# Patient Record
Sex: MALE | Race: WHITE | NOT HISPANIC OR LATINO
[De-identification: names, ages, dates, MRNs, and addresses within clinical notes are randomized per-mention and may not be internally consistent; named-entity substitution may affect disease eponyms.]

---

## 2018-03-13 ENCOUNTER — RESULT REVIEW (OUTPATIENT)
Age: 63
End: 2018-03-13

## 2018-03-16 PROBLEM — Z00.00 ENCOUNTER FOR PREVENTIVE HEALTH EXAMINATION: Status: ACTIVE | Noted: 2018-03-16

## 2018-04-18 ENCOUNTER — APPOINTMENT (OUTPATIENT)
Dept: UROLOGY | Facility: CLINIC | Age: 63
End: 2018-04-18

## 2018-05-23 ENCOUNTER — OUTPATIENT (OUTPATIENT)
Dept: OUTPATIENT SERVICES | Facility: HOSPITAL | Age: 63
LOS: 1 days | Discharge: HOME | End: 2018-05-23

## 2018-05-23 ENCOUNTER — APPOINTMENT (OUTPATIENT)
Dept: UROLOGY | Facility: CLINIC | Age: 63
End: 2018-05-23
Payer: COMMERCIAL

## 2018-05-23 VITALS
HEART RATE: 96 BPM | HEIGHT: 68 IN | WEIGHT: 248 LBS | BODY MASS INDEX: 37.59 KG/M2 | SYSTOLIC BLOOD PRESSURE: 141 MMHG | DIASTOLIC BLOOD PRESSURE: 79 MMHG

## 2018-05-23 DIAGNOSIS — K46.9 UNSPECIFIED ABDOMINAL HERNIA W/OUT OBSTRUCTION OR GANGRENE: ICD-10-CM

## 2018-05-23 DIAGNOSIS — E11.9 TYPE 2 DIABETES MELLITUS W/OUT COMPLICATIONS: ICD-10-CM

## 2018-05-23 DIAGNOSIS — Z78.9 OTHER SPECIFIED HEALTH STATUS: ICD-10-CM

## 2018-05-23 DIAGNOSIS — E78.5 HYPERLIPIDEMIA, UNSPECIFIED: ICD-10-CM

## 2018-05-23 DIAGNOSIS — R35.1 NOCTURIA: ICD-10-CM

## 2018-05-23 DIAGNOSIS — Z12.5 ENCOUNTER FOR SCREENING FOR MALIGNANT NEOPLASM OF PROSTATE: ICD-10-CM

## 2018-05-23 PROCEDURE — 99205 OFFICE O/P NEW HI 60 MIN: CPT

## 2018-05-23 RX ORDER — TRIAMCINOLONE ACETONIDE 5 MG/G
0.5 CREAM TOPICAL
Qty: 60 | Refills: 0 | Status: ACTIVE | COMMUNITY
Start: 2018-02-19

## 2018-05-23 RX ORDER — SITAGLIPTIN AND METFORMIN HYDROCHLORIDE 50; 1000 MG/1; MG/1
50-1000 TABLET, FILM COATED ORAL
Qty: 180 | Refills: 0 | Status: ACTIVE | COMMUNITY
Start: 2017-08-15

## 2018-05-23 RX ORDER — SIMVASTATIN 10 MG/1
10 TABLET, FILM COATED ORAL
Qty: 60 | Refills: 0 | Status: ACTIVE | COMMUNITY
Start: 2017-07-21

## 2018-05-23 RX ORDER — CHOLECALCIFEROL (VITAMIN D3) 25 MCG
25 MCG CAPSULE ORAL
Qty: 60 | Refills: 0 | Status: ACTIVE | COMMUNITY
Start: 2018-03-14

## 2018-05-23 RX ORDER — LEVOTHYROXINE SODIUM 0.12 MG/1
125 TABLET ORAL
Qty: 120 | Refills: 0 | Status: ACTIVE | COMMUNITY
Start: 2018-03-14

## 2018-05-23 RX ORDER — LEVOTHYROXINE SODIUM 0.17 MG/1
175 TABLET ORAL
Qty: 30 | Refills: 0 | Status: ACTIVE | COMMUNITY
Start: 2017-10-16

## 2018-05-25 LAB
PSA FREE FLD-MCNC: 26.9
PSA FREE SERPL-MCNC: 0.32 NG/ML
PSA SERPL-MCNC: 1.19 NG/ML

## 2018-05-30 ENCOUNTER — APPOINTMENT (OUTPATIENT)
Dept: UROLOGY | Facility: CLINIC | Age: 63
End: 2018-05-30
Payer: COMMERCIAL

## 2018-05-30 ENCOUNTER — OTHER (OUTPATIENT)
Age: 63
End: 2018-05-30

## 2018-05-30 VITALS
WEIGHT: 248 LBS | HEART RATE: 94 BPM | BODY MASS INDEX: 37.59 KG/M2 | HEIGHT: 68 IN | SYSTOLIC BLOOD PRESSURE: 132 MMHG | DIASTOLIC BLOOD PRESSURE: 76 MMHG

## 2018-05-30 PROCEDURE — 99214 OFFICE O/P EST MOD 30 MIN: CPT

## 2018-05-30 RX ORDER — CLOBETASOL PROPIONATE 0.5 MG/G
0.05 OINTMENT TOPICAL
Qty: 120 | Refills: 0 | Status: ACTIVE | COMMUNITY
Start: 2018-02-20

## 2018-05-30 RX ORDER — TERBINAFINE HYDROCHLORIDE 250 MG/1
250 TABLET ORAL
Qty: 30 | Refills: 0 | Status: ACTIVE | COMMUNITY
Start: 2018-03-16

## 2018-05-30 RX ORDER — GABAPENTIN 300 MG/1
300 CAPSULE ORAL
Qty: 60 | Refills: 0 | Status: ACTIVE | COMMUNITY
Start: 2018-05-22

## 2018-05-30 RX ORDER — BUPROPION HYDROCHLORIDE 150 MG/1
150 TABLET, FILM COATED, EXTENDED RELEASE ORAL
Qty: 60 | Refills: 0 | Status: ACTIVE | COMMUNITY
Start: 2017-10-20

## 2018-05-30 RX ORDER — HYDROCORTISONE 1 %
12 CREAM (GRAM) TOPICAL
Qty: 400 | Refills: 0 | Status: ACTIVE | COMMUNITY
Start: 2018-02-12

## 2018-05-30 RX ORDER — HYDROXYZINE HYDROCHLORIDE 25 MG/1
25 TABLET ORAL
Qty: 30 | Refills: 0 | Status: ACTIVE | COMMUNITY
Start: 2018-02-19

## 2018-05-30 RX ORDER — OXYCODONE AND ACETAMINOPHEN 10; 325 MG/1; MG/1
10-325 TABLET ORAL
Qty: 90 | Refills: 0 | Status: ACTIVE | COMMUNITY
Start: 2017-12-18

## 2018-05-30 RX ORDER — PEN NEEDLE, DIABETIC 29 G X1/2"
31G X 8 MM NEEDLE, DISPOSABLE MISCELLANEOUS
Qty: 100 | Refills: 0 | Status: ACTIVE | COMMUNITY
Start: 2018-01-10

## 2018-05-30 NOTE — ASSESSMENT
[FreeTextEntry1] : This is a 63 year male who has history of ED for 3 years.\par Too soft to penetrate.\par Sex drive is normal.\par Tried viagra and cialis full dose with poor effect.\par \par states that urinates about 3-4 times per nights\par \par left testicular nodule not painful\par LELE showed about 50g prostate no nodules. \par \par Culture - Urine\par \par May 25th 2018 PSA Profile - Total = 1.19\par US Doppler Scrotum and bladder sonogram were not done yet\par Patient is interested in penile prosthesis\par Patient would like to proceed with implantation of inflatable penile prosthesis.  We discussed other options for treatment including injecting trimix, vacuum erection device but would like to proceed with implantation.  Aware of risks and benefits of the procedure including risk of infection and erosion of the device that may result in the need to remove the device with possible reimplantation in the future. Also aware that there will be pain and swelling to the penis and scrotum after the surgery.  Also aware that there are risks to the anesthesia including death. Also aware of the risk of having the perception of a shortened penis after the procedure.  He will not be allowed to have sex at least until 6 weeks after the surgery. \par

## 2018-05-30 NOTE — LETTER BODY
[Dear  ___] : Dear  [unfilled], [Courtesy Letter:] : I had the pleasure of seeing your patient, [unfilled], in my office today. [Please see my note below.] : Please see my note below. [Consult Closing:] : Thank you very much for allowing me to participate in the care of this patient.  If you have any questions, please do not hesitate to contact me. [Sincerely,] : Sincerely, [FreeTextEntry3] : Thomas Epperson MD\par Director of Male Sexual Dysfunction and Urologic Prosthetics

## 2018-05-30 NOTE — HISTORY OF PRESENT ILLNESS
[FreeTextEntry1] : This is a 63 year male who has history of ED for 3 years.\par Too soft to penetrate.\par Sex drive is normal.\par Tried viagra and cialis full dose with poor effect.\par \par states that urinates about 3-4 times per nights\par \par left testicular nodule not painful\par LELE showed about 50g prostate no nodules. \par \par Culture - Urine\par \par May 25th 2018 PSA Profile - Total = 1.19\par US Doppler Scrotum and bladder sonogram were not done yet\par Patient is interested in penile prosthesis\par Patient would like to proceed with implantation of inflatable penile prosthesis.  We discussed other options for treatment including injecting trimix, vacuum erection device but would like to proceed with implantation.  Aware of risks and benefits of the procedure including risk of infection and erosion of the device that may result in the need to remove the device with possible reimplantation in the future. Also aware that there will be pain and swelling to the penis and scrotum after the surgery.  Also aware that there are risks to the anesthesia including death. Also aware of the risk of having the perception of a shortened penis after the procedure.  He will not be allowed to have sex at least until 6 weeks after the surgery. \par \par Arrangements for surgery will be started. \par

## 2018-05-30 NOTE — PHYSICAL EXAM
[General Appearance - Well Developed] : well developed [General Appearance - Well Nourished] : well nourished [Normal Appearance] : normal appearance [Well Groomed] : well groomed [General Appearance - In No Acute Distress] : no acute distress [Abdomen Soft] : soft [Abdomen Tenderness] : non-tender [Costovertebral Angle Tenderness] : no ~M costovertebral angle tenderness [Urethral Meatus] : meatus normal [FreeTextEntry1] : \par left testicular nodule not painful\par LELE showed about 50g prostate no nodules [Skin Color & Pigmentation] : normal skin color and pigmentation [Edema] : no peripheral edema [] : no respiratory distress [Respiration, Rhythm And Depth] : normal respiratory rhythm and effort [Exaggerated Use Of Accessory Muscles For Inspiration] : no accessory muscle use [Oriented To Time, Place, And Person] : oriented to person, place, and time [Affect] : the affect was normal [Mood] : the mood was normal [Not Anxious] : not anxious [Normal Station and Gait] : the gait and station were normal for the patient's age [No Focal Deficits] : no focal deficits

## 2018-05-31 ENCOUNTER — OUTPATIENT (OUTPATIENT)
Dept: OUTPATIENT SERVICES | Facility: HOSPITAL | Age: 63
LOS: 1 days | Discharge: HOME | End: 2018-05-31

## 2018-05-31 DIAGNOSIS — N40.1 BENIGN PROSTATIC HYPERPLASIA WITH LOWER URINARY TRACT SYMPTOMS: ICD-10-CM

## 2018-06-02 ENCOUNTER — OUTPATIENT (OUTPATIENT)
Dept: OUTPATIENT SERVICES | Facility: HOSPITAL | Age: 63
LOS: 1 days | Discharge: HOME | End: 2018-06-02

## 2018-06-02 DIAGNOSIS — N50.89 OTHER SPECIFIED DISORDERS OF THE MALE GENITAL ORGANS: ICD-10-CM

## 2018-06-08 LAB — BACTERIA UR CULT: NORMAL

## 2018-06-20 ENCOUNTER — APPOINTMENT (OUTPATIENT)
Dept: UROLOGY | Facility: CLINIC | Age: 63
End: 2018-06-20
Payer: COMMERCIAL

## 2018-06-20 VITALS
HEART RATE: 107 BPM | HEIGHT: 68 IN | WEIGHT: 248 LBS | SYSTOLIC BLOOD PRESSURE: 141 MMHG | BODY MASS INDEX: 37.59 KG/M2 | DIASTOLIC BLOOD PRESSURE: 75 MMHG

## 2018-06-20 DIAGNOSIS — N50.89 OTHER SPECIFIED DISORDERS OF THE MALE GENITAL ORGANS: ICD-10-CM

## 2018-06-20 DIAGNOSIS — Z12.5 ENCOUNTER FOR SCREENING FOR MALIGNANT NEOPLASM OF PROSTATE: ICD-10-CM

## 2018-06-20 PROCEDURE — 99213 OFFICE O/P EST LOW 20 MIN: CPT

## 2018-06-25 PROBLEM — N50.89 TESTICULAR NODULE: Status: ACTIVE | Noted: 2018-05-23

## 2018-06-25 PROBLEM — Z12.5 SCREENING FOR PROSTATE CANCER: Status: ACTIVE | Noted: 2018-05-23

## 2018-07-06 ENCOUNTER — OUTPATIENT (OUTPATIENT)
Dept: OUTPATIENT SERVICES | Facility: HOSPITAL | Age: 63
LOS: 1 days | Discharge: HOME | End: 2018-07-06

## 2018-07-06 VITALS
SYSTOLIC BLOOD PRESSURE: 145 MMHG | OXYGEN SATURATION: 98 % | TEMPERATURE: 97 F | DIASTOLIC BLOOD PRESSURE: 65 MMHG | HEIGHT: 69 IN | RESPIRATION RATE: 18 BRPM | WEIGHT: 251.33 LBS | HEART RATE: 89 BPM

## 2018-07-06 DIAGNOSIS — Z98.890 OTHER SPECIFIED POSTPROCEDURAL STATES: Chronic | ICD-10-CM

## 2018-07-06 DIAGNOSIS — N52.8 OTHER MALE ERECTILE DYSFUNCTION: ICD-10-CM

## 2018-07-06 DIAGNOSIS — Z01.818 ENCOUNTER FOR OTHER PREPROCEDURAL EXAMINATION: ICD-10-CM

## 2018-07-06 LAB
ALBUMIN SERPL ELPH-MCNC: 4.8 G/DL — SIGNIFICANT CHANGE UP (ref 3.5–5.2)
ALP SERPL-CCNC: 90 U/L — SIGNIFICANT CHANGE UP (ref 30–115)
ALT FLD-CCNC: 30 U/L — SIGNIFICANT CHANGE UP (ref 0–41)
ANION GAP SERPL CALC-SCNC: 16 MMOL/L — HIGH (ref 7–14)
APPEARANCE UR: CLEAR — SIGNIFICANT CHANGE UP
APTT BLD: 33.6 SEC — SIGNIFICANT CHANGE UP (ref 27–39.2)
AST SERPL-CCNC: 30 U/L — SIGNIFICANT CHANGE UP (ref 0–41)
BASOPHILS # BLD AUTO: 0.07 K/UL — SIGNIFICANT CHANGE UP (ref 0–0.2)
BASOPHILS NFR BLD AUTO: 0.6 % — SIGNIFICANT CHANGE UP (ref 0–1)
BILIRUB SERPL-MCNC: 0.6 MG/DL — SIGNIFICANT CHANGE UP (ref 0.2–1.2)
BILIRUB UR-MCNC: (no result)
BUN SERPL-MCNC: 14 MG/DL — SIGNIFICANT CHANGE UP (ref 10–20)
CALCIUM SERPL-MCNC: 9.6 MG/DL — SIGNIFICANT CHANGE UP (ref 8.5–10.1)
CHLORIDE SERPL-SCNC: 95 MMOL/L — LOW (ref 98–110)
CO2 SERPL-SCNC: 24 MMOL/L — SIGNIFICANT CHANGE UP (ref 17–32)
COLOR SPEC: SIGNIFICANT CHANGE UP
CREAT SERPL-MCNC: 1 MG/DL — SIGNIFICANT CHANGE UP (ref 0.7–1.5)
DIFF PNL FLD: NEGATIVE — SIGNIFICANT CHANGE UP
EOSINOPHIL # BLD AUTO: 0.26 K/UL — SIGNIFICANT CHANGE UP (ref 0–0.7)
EOSINOPHIL NFR BLD AUTO: 2.2 % — SIGNIFICANT CHANGE UP (ref 0–8)
EPI CELLS # UR: (no result) /HPF
ESTIMATED AVERAGE GLUCOSE: 180 MG/DL — HIGH (ref 68–114)
GLUCOSE SERPL-MCNC: 228 MG/DL — HIGH (ref 70–99)
GLUCOSE UR QL: 500 MG/DL
HBA1C BLD-MCNC: 7.9 % — HIGH (ref 4–5.6)
HCT VFR BLD CALC: 45.9 % — SIGNIFICANT CHANGE UP (ref 42–52)
HGB BLD-MCNC: 15.4 G/DL — SIGNIFICANT CHANGE UP (ref 14–18)
IMM GRANULOCYTES NFR BLD AUTO: 0.7 % — HIGH (ref 0.1–0.3)
INR BLD: 1.11 RATIO — SIGNIFICANT CHANGE UP (ref 0.65–1.3)
KETONES UR-MCNC: (no result)
LEUKOCYTE ESTERASE UR-ACNC: NEGATIVE — SIGNIFICANT CHANGE UP
LYMPHOCYTES # BLD AUTO: 2.7 K/UL — SIGNIFICANT CHANGE UP (ref 1.2–3.4)
LYMPHOCYTES # BLD AUTO: 23.2 % — SIGNIFICANT CHANGE UP (ref 20.5–51.1)
MCHC RBC-ENTMCNC: 28.9 PG — SIGNIFICANT CHANGE UP (ref 27–31)
MCHC RBC-ENTMCNC: 33.6 G/DL — SIGNIFICANT CHANGE UP (ref 32–37)
MCV RBC AUTO: 86.1 FL — SIGNIFICANT CHANGE UP (ref 80–94)
MONOCYTES # BLD AUTO: 0.97 K/UL — HIGH (ref 0.1–0.6)
MONOCYTES NFR BLD AUTO: 8.3 % — SIGNIFICANT CHANGE UP (ref 1.7–9.3)
NEUTROPHILS # BLD AUTO: 7.54 K/UL — HIGH (ref 1.4–6.5)
NEUTROPHILS NFR BLD AUTO: 65 % — SIGNIFICANT CHANGE UP (ref 42.2–75.2)
NITRITE UR-MCNC: NEGATIVE — SIGNIFICANT CHANGE UP
NRBC # BLD: 0 /100 WBCS — SIGNIFICANT CHANGE UP (ref 0–0)
PH UR: 5.5 — SIGNIFICANT CHANGE UP (ref 5–8)
PLATELET # BLD AUTO: 302 K/UL — SIGNIFICANT CHANGE UP (ref 130–400)
POTASSIUM SERPL-MCNC: 4.8 MMOL/L — SIGNIFICANT CHANGE UP (ref 3.5–5)
POTASSIUM SERPL-SCNC: 4.8 MMOL/L — SIGNIFICANT CHANGE UP (ref 3.5–5)
PROT SERPL-MCNC: 7.8 G/DL — SIGNIFICANT CHANGE UP (ref 6–8)
PROT UR-MCNC: (no result)
PROTHROM AB SERPL-ACNC: 12 SEC — SIGNIFICANT CHANGE UP (ref 9.95–12.87)
RBC # BLD: 5.33 M/UL — SIGNIFICANT CHANGE UP (ref 4.7–6.1)
RBC # FLD: 12.6 % — SIGNIFICANT CHANGE UP (ref 11.5–14.5)
RBC CASTS # UR COMP ASSIST: SIGNIFICANT CHANGE UP /HPF
SODIUM SERPL-SCNC: 135 MMOL/L — SIGNIFICANT CHANGE UP (ref 135–146)
SP GR SPEC: >=1.03 — SIGNIFICANT CHANGE UP (ref 1.01–1.03)
UROBILINOGEN FLD QL: 1 (ref 0.2–0.2)
WBC # BLD: 11.62 K/UL — HIGH (ref 4.8–10.8)
WBC # FLD AUTO: 11.62 K/UL — HIGH (ref 4.8–10.8)
WBC UR QL: SIGNIFICANT CHANGE UP /HPF

## 2018-07-06 NOTE — H&P PST ADULT - HISTORY OF PRESENT ILLNESS
Patient with H/O DM and has a problem of erectile dysfunction x few years. Patient denies any c/o cp, sob, palpitations fever, cough or dysuria. Ex tolerance of 1 fos walk wo sob. SHILO screening tool Positive. No testings done.

## 2018-07-06 NOTE — H&P PST ADULT - FAMILY HISTORY
Mother  Still living? No  Family history of lung cancer, Age at diagnosis: Age Unknown     Father  Still living? No  Family history of aneurysm, Age at diagnosis: Age Unknown

## 2018-07-06 NOTE — H&P PST ADULT - PMH
Back pain    DM (diabetes mellitus)    Hypercholesteremia    Hypothyroidism    Obesity (BMI 35.0-39.9 without comorbidity) Back pain    DM (diabetes mellitus)    Erectile dysfunction of non-organic origin    Hypercholesteremia    Hypothyroidism    Obesity (BMI 35.0-39.9 without comorbidity)

## 2018-07-06 NOTE — H&P PST ADULT - REASON FOR ADMISSION
62 yo m presents to [facility] for implantation of inflatable penile prosthesis under GA on 07/20/2018  by Dr. Epperson at [facility].

## 2018-07-07 LAB
CULTURE RESULTS: NO GROWTH — SIGNIFICANT CHANGE UP
SPECIMEN SOURCE: SIGNIFICANT CHANGE UP

## 2018-07-19 NOTE — ASU PATIENT PROFILE, ADULT - PMH
Back pain    DM (diabetes mellitus)    Erectile dysfunction of non-organic origin    Hypercholesteremia    Hypothyroidism    Obesity (BMI 35.0-39.9 without comorbidity)

## 2018-07-19 NOTE — ASU PATIENT PROFILE, ADULT - TEACHING/LEARNING RELIGIOUS CONSIDERATIONS
Dr. Rios updated on patient labs and plan of care from GI.  States will place cardiology consult    none

## 2018-07-20 ENCOUNTER — INPATIENT (INPATIENT)
Facility: HOSPITAL | Age: 63
LOS: 0 days | Discharge: HOME | End: 2018-07-21
Attending: UROLOGY | Admitting: UROLOGY

## 2018-07-20 ENCOUNTER — APPOINTMENT (OUTPATIENT)
Dept: UROLOGY | Facility: CLINIC | Age: 63
End: 2018-07-20
Payer: COMMERCIAL

## 2018-07-20 VITALS
OXYGEN SATURATION: 98 % | SYSTOLIC BLOOD PRESSURE: 137 MMHG | TEMPERATURE: 96 F | HEIGHT: 69 IN | RESPIRATION RATE: 18 BRPM | DIASTOLIC BLOOD PRESSURE: 88 MMHG | WEIGHT: 251.11 LBS | HEART RATE: 80 BPM

## 2018-07-20 DIAGNOSIS — Z98.890 OTHER SPECIFIED POSTPROCEDURAL STATES: Chronic | ICD-10-CM

## 2018-07-20 PROCEDURE — 54405 INSERT MULTI-COMP PENIS PROS: CPT

## 2018-07-20 RX ORDER — SIMVASTATIN 20 MG/1
10 TABLET, FILM COATED ORAL AT BEDTIME
Qty: 0 | Refills: 0 | Status: DISCONTINUED | OUTPATIENT
Start: 2018-07-20 | End: 2018-07-21

## 2018-07-20 RX ORDER — ENOXAPARIN SODIUM 100 MG/ML
40 INJECTION SUBCUTANEOUS EVERY 24 HOURS
Qty: 0 | Refills: 0 | Status: DISCONTINUED | OUTPATIENT
Start: 2018-07-20 | End: 2018-07-21

## 2018-07-20 RX ORDER — ACETAMINOPHEN 500 MG
650 TABLET ORAL EVERY 6 HOURS
Qty: 0 | Refills: 0 | Status: DISCONTINUED | OUTPATIENT
Start: 2018-07-20 | End: 2018-07-21

## 2018-07-20 RX ORDER — OXYCODONE HYDROCHLORIDE 5 MG/1
10 TABLET ORAL EVERY 4 HOURS
Qty: 0 | Refills: 0 | Status: DISCONTINUED | OUTPATIENT
Start: 2018-07-20 | End: 2018-07-21

## 2018-07-20 RX ORDER — SODIUM CHLORIDE 9 MG/ML
1000 INJECTION, SOLUTION INTRAVENOUS
Qty: 0 | Refills: 0 | Status: DISCONTINUED | OUTPATIENT
Start: 2018-07-20 | End: 2018-07-20

## 2018-07-20 RX ORDER — ZOLPIDEM TARTRATE 10 MG/1
5 TABLET ORAL AT BEDTIME
Qty: 0 | Refills: 0 | Status: DISCONTINUED | OUTPATIENT
Start: 2018-07-20 | End: 2018-07-21

## 2018-07-20 RX ORDER — OXYCODONE HYDROCHLORIDE 5 MG/1
15 TABLET ORAL EVERY 4 HOURS
Qty: 0 | Refills: 0 | Status: DISCONTINUED | OUTPATIENT
Start: 2018-07-20 | End: 2018-07-21

## 2018-07-20 RX ORDER — VANCOMYCIN HCL 1 G
1000 VIAL (EA) INTRAVENOUS EVERY 12 HOURS
Qty: 0 | Refills: 0 | Status: DISCONTINUED | OUTPATIENT
Start: 2018-07-21 | End: 2018-07-21

## 2018-07-20 RX ORDER — SODIUM CHLORIDE 9 MG/ML
1000 INJECTION INTRAMUSCULAR; INTRAVENOUS; SUBCUTANEOUS
Qty: 0 | Refills: 0 | Status: DISCONTINUED | OUTPATIENT
Start: 2018-07-20 | End: 2018-07-21

## 2018-07-20 RX ORDER — LEVOTHYROXINE SODIUM 125 MCG
125 TABLET ORAL DAILY
Qty: 0 | Refills: 0 | Status: DISCONTINUED | OUTPATIENT
Start: 2018-07-20 | End: 2018-07-21

## 2018-07-20 RX ORDER — PANTOPRAZOLE SODIUM 20 MG/1
40 TABLET, DELAYED RELEASE ORAL
Qty: 0 | Refills: 0 | Status: DISCONTINUED | OUTPATIENT
Start: 2018-07-20 | End: 2018-07-21

## 2018-07-20 RX ORDER — MORPHINE SULFATE 50 MG/1
4 CAPSULE, EXTENDED RELEASE ORAL
Qty: 0 | Refills: 0 | Status: DISCONTINUED | OUTPATIENT
Start: 2018-07-20 | End: 2018-07-20

## 2018-07-20 RX ORDER — MORPHINE SULFATE 50 MG/1
2 CAPSULE, EXTENDED RELEASE ORAL EVERY 4 HOURS
Qty: 0 | Refills: 0 | Status: DISCONTINUED | OUTPATIENT
Start: 2018-07-20 | End: 2018-07-21

## 2018-07-20 RX ADMIN — OXYCODONE HYDROCHLORIDE 15 MILLIGRAM(S): 5 TABLET ORAL at 23:10

## 2018-07-20 RX ADMIN — Medication 650 MILLIGRAM(S): at 18:34

## 2018-07-20 RX ADMIN — Medication 650 MILLIGRAM(S): at 23:10

## 2018-07-20 RX ADMIN — Medication 1 TABLET(S): at 21:34

## 2018-07-20 RX ADMIN — MORPHINE SULFATE 4 MILLIGRAM(S): 50 CAPSULE, EXTENDED RELEASE ORAL at 16:53

## 2018-07-20 RX ADMIN — ZOLPIDEM TARTRATE 5 MILLIGRAM(S): 10 TABLET ORAL at 22:48

## 2018-07-20 RX ADMIN — OXYCODONE HYDROCHLORIDE 15 MILLIGRAM(S): 5 TABLET ORAL at 22:48

## 2018-07-20 RX ADMIN — OXYCODONE HYDROCHLORIDE 15 MILLIGRAM(S): 5 TABLET ORAL at 18:33

## 2018-07-20 RX ADMIN — MORPHINE SULFATE 4 MILLIGRAM(S): 50 CAPSULE, EXTENDED RELEASE ORAL at 17:08

## 2018-07-20 RX ADMIN — Medication 650 MILLIGRAM(S): at 23:09

## 2018-07-20 RX ADMIN — SIMVASTATIN 10 MILLIGRAM(S): 20 TABLET, FILM COATED ORAL at 21:34

## 2018-07-20 RX ADMIN — MORPHINE SULFATE 4 MILLIGRAM(S): 50 CAPSULE, EXTENDED RELEASE ORAL at 17:38

## 2018-07-21 ENCOUNTER — TRANSCRIPTION ENCOUNTER (OUTPATIENT)
Age: 63
End: 2018-07-21

## 2018-07-21 VITALS
DIASTOLIC BLOOD PRESSURE: 62 MMHG | RESPIRATION RATE: 16 BRPM | SYSTOLIC BLOOD PRESSURE: 132 MMHG | TEMPERATURE: 98 F | HEART RATE: 88 BPM

## 2018-07-21 DIAGNOSIS — Z98.890 OTHER SPECIFIED POSTPROCEDURAL STATES: ICD-10-CM

## 2018-07-21 RX ORDER — METFORMIN HYDROCHLORIDE 850 MG/1
1000 TABLET ORAL ONCE
Qty: 0 | Refills: 0 | Status: DISCONTINUED | OUTPATIENT
Start: 2018-07-21 | End: 2018-07-21

## 2018-07-21 RX ORDER — LEVOTHYROXINE SODIUM 125 MCG
2 TABLET ORAL
Qty: 0 | Refills: 0 | COMMUNITY

## 2018-07-21 RX ORDER — ASPIRIN/CALCIUM CARB/MAGNESIUM 324 MG
1 TABLET ORAL
Qty: 0 | Refills: 0 | COMMUNITY

## 2018-07-21 RX ORDER — LEVOTHYROXINE SODIUM 125 MCG
1 TABLET ORAL
Qty: 0 | Refills: 0 | DISCHARGE
Start: 2018-07-21

## 2018-07-21 RX ORDER — SIMVASTATIN 20 MG/1
1 TABLET, FILM COATED ORAL
Qty: 0 | Refills: 0 | DISCHARGE
Start: 2018-07-21

## 2018-07-21 RX ORDER — SIMVASTATIN 20 MG/1
1 TABLET, FILM COATED ORAL
Qty: 0 | Refills: 0 | COMMUNITY

## 2018-07-21 RX ADMIN — OXYCODONE HYDROCHLORIDE 15 MILLIGRAM(S): 5 TABLET ORAL at 10:09

## 2018-07-21 RX ADMIN — PANTOPRAZOLE SODIUM 40 MILLIGRAM(S): 20 TABLET, DELAYED RELEASE ORAL at 05:26

## 2018-07-21 RX ADMIN — Medication 1 TABLET(S): at 05:26

## 2018-07-21 RX ADMIN — Medication 1 TABLET(S): at 17:03

## 2018-07-21 RX ADMIN — OXYCODONE HYDROCHLORIDE 15 MILLIGRAM(S): 5 TABLET ORAL at 17:02

## 2018-07-21 RX ADMIN — Medication 650 MILLIGRAM(S): at 05:26

## 2018-07-21 RX ADMIN — Medication 650 MILLIGRAM(S): at 16:58

## 2018-07-21 RX ADMIN — OXYCODONE HYDROCHLORIDE 15 MILLIGRAM(S): 5 TABLET ORAL at 10:10

## 2018-07-21 RX ADMIN — Medication 250 MILLIGRAM(S): at 14:15

## 2018-07-21 RX ADMIN — OXYCODONE HYDROCHLORIDE 15 MILLIGRAM(S): 5 TABLET ORAL at 04:18

## 2018-07-21 RX ADMIN — Medication 250 MILLIGRAM(S): at 02:04

## 2018-07-21 RX ADMIN — Medication 650 MILLIGRAM(S): at 11:30

## 2018-07-21 RX ADMIN — Medication 125 MICROGRAM(S): at 05:26

## 2018-07-21 RX ADMIN — OXYCODONE HYDROCHLORIDE 15 MILLIGRAM(S): 5 TABLET ORAL at 04:16

## 2018-07-21 RX ADMIN — Medication 650 MILLIGRAM(S): at 11:31

## 2018-07-21 RX ADMIN — ENOXAPARIN SODIUM 40 MILLIGRAM(S): 100 INJECTION SUBCUTANEOUS at 05:26

## 2018-07-21 NOTE — PROGRESS NOTE ADULT - PROBLEM SELECTOR PLAN 1
c/w pain meds  OOB--ambulate  diego removed-TOV  plan to d/c home today with or without drain depending on output today  d/w DR Burgess to f/u

## 2018-07-21 NOTE — DISCHARGE NOTE ADULT - HOSPITAL COURSE
patient admitted for observation, s/p penile implant, NORAH drained serosanguinous driange about 300ml total, patient diego catheter was removed and patient ws able to void, patient pain was well controlled with percocet and so patient was discharged to home with NORAH drain. patient admitted for observation, s/p penile implant, NORAH drained serosanguinous drainage about 300ml total  but steadily decreasing in rate of drainage, patient diego catheter was removed and patient ws able to void, patient pain was well controlled with percocet and so patient was discharged to home with NORAH drain.

## 2018-07-21 NOTE — DISCHARGE NOTE ADULT - CARE PLAN
Principal Discharge DX:	S/P urological surgery  Goal:	healing of wounds and normal sexual function  Assessment and plan of treatment:	NORAH drain-empty drain daily and as needed as instructed  follow up with DR Epperson on 7/23/18  call doctor if fever, severe pain, severe swelling, or bleeding

## 2018-07-21 NOTE — DISCHARGE NOTE ADULT - MEDICATION SUMMARY - MEDICATIONS TO TAKE
I will START or STAY ON the medications listed below when I get home from the hospital:    Percocet 10/325 oral tablet  -- 1 tab(s) by mouth 3 times a day (after meals), As Needed for surgical pain MDD:3 tab  -- Indication: For pain    Janumet 50 mg-1000 mg oral tablet  -- 1 tab(s) by mouth 2 times a day  -- Indication: For dm    simvastatin 10 mg oral tablet  -- 1 tab(s) by mouth once a day (at bedtime)  -- Indication: For hyperlipidemia    sulfamethoxazole-trimethoprim 800 mg-160 mg oral tablet  -- 1 tab(s) by mouth every 12 hours  -- Indication: For S/P urological surgery    levothyroxine 125 mcg (0.125 mg) oral tablet  -- 1 tab(s) by mouth once a day  -- Indication: For hypothyroid I will START or STAY ON the medications listed below when I get home from the hospital:    Norco 10 mg-325 mg oral tablet  -- 1 tab(s) by mouth 3 times a day, As Needed MDD:3 tab for surgical pain  -- Caution federal law prohibits the transfer of this drug to any person other  than the person for whom it was prescribed.  May cause drowsiness.  Alcohol may intensify this effect.  Use care when operating dangerous machinery.  This product contains acetaminophen.  Do not use  with any other product containing acetaminophen to prevent possible liver damage.  Using more of this medication than prescribed may cause serious breathing problems.    -- Indication: For Surgical pain    Janumet 50 mg-1000 mg oral tablet  -- 1 tab(s) by mouth 2 times a day  -- Indication: For dm    simvastatin 10 mg oral tablet  -- 1 tab(s) by mouth once a day (at bedtime)  -- Indication: For hyperlipidemia    sulfamethoxazole-trimethoprim 800 mg-160 mg oral tablet  -- 1 tab(s) by mouth every 12 hours  -- Indication: For S/P urological surgery    levothyroxine 125 mcg (0.125 mg) oral tablet  -- 1 tab(s) by mouth once a day  -- Indication: For hypothyroid

## 2018-07-21 NOTE — DISCHARGE NOTE ADULT - CARE PROVIDER_API CALL
Thomas Epperson), [facility]  [address] [place] [address]  [address] [place] [address]  Phone: [phone]  Fax: [phone]

## 2018-07-21 NOTE — DISCHARGE NOTE ADULT - PATIENT PORTAL LINK FT
You can access the [facility] Patient Portal, offered by [facility], by registering with the following website: [email or web address]

## 2018-07-21 NOTE — PROGRESS NOTE ADULT - SUBJECTIVE AND OBJECTIVE BOX
64 y/o male  s/p penile implant  seen and examined in bed  no complaints  v/s afebrile  pe  dressing  cdi  ingrid 310 cc blood tinged  abd + bs  soft  neuroaxox3    a&p   s/p penile implant  pain management  will follow
HPI:  Patient c/o pain to penis overnight but relieved with pain meds.    PAST MEDICAL & SURGICAL HISTORY:  Erectile dysfunction of non-organic origin  Obesity (BMI 35.0-39.9 without comorbidity)  Back pain  Hypothyroidism  DM (diabetes mellitus)  Hypercholesteremia  S/P hernia repair: Umbilical      Allergies  penicillin (Angioedema)      MEDICATIONS  (STANDING):  acetaminophen   Tablet. 650 milliGRAM(s) Oral every 6 hours  enoxaparin Injectable 40 milliGRAM(s) SubCutaneous every 24 hours  levothyroxine 125 MICROGram(s) Oral daily  pantoprazole    Tablet 40 milliGRAM(s) Oral before breakfast  simvastatin 10 milliGRAM(s) Oral at bedtime  sodium chloride 0.9%. 1000 milliLiter(s) (75 mL/Hr) IV Continuous <Continuous>  trimethoprim  160 mG/sulfamethoxazole 800 mG 1 Tablet(s) Oral every 12 hours  vancomycin  IVPB 1000 milliGRAM(s) IV Intermittent every 12 hours    MEDICATIONS  (PRN):  morphine  - Injectable 2 milliGRAM(s) IV Push every 4 hours PRN breakthrough  oxyCODONE    IR 10 milliGRAM(s) Oral every 4 hours PRN Moderate Pain (4 - 6)  oxyCODONE    IR 15 milliGRAM(s) Oral every 4 hours PRN Severe Pain (7 - 10)  zolpidem 5 milliGRAM(s) Oral at bedtime PRN Insomnia      General:	no fever, weight loss,  chills  Skin: no rash, ulcers  Respiratory and Thorax: no cough, wheeze,  sob  Cardiovascular:	no chest pain, palpitations, dizziness  Gastrointestinal:	no nausea, vomiting, diarrhea, abd pain  Genitourinary:	has diego  Musculoskeletal:	no joint pains  Neurological:	 no speech disturbance, focal weakness, numbness  Psychiatric:	no depression, anxiety, psychosis  Endocrine:	no polyuria, polydipsia        Vital Signs Last 24 Hrs  T(F): 99.4 (21 Jul 2018 05:20), Max: 99.4 (21 Jul 2018 05:20)  HR: 91 (21 Jul 2018 05:20) (80 - 103)  BP: 126/61 (21 Jul 2018 05:20) (126/61 - 185/98)  RR: 16 (21 Jul 2018 05:20) (10 - 18)  SpO2: 98% (20 Jul 2018 17:54) (96% - 99%)    PHYSICAL EXAM:      Constitutional: A&Ox4  Respiratory: cta b/l  Cardiovascular: s1 s2 rrr  Gastrointestinal: soft nt  nd + bs no rebound or guarding  Genitourinary: no cva tenderness, diego with clear urine, removed easily without complication, pt tolerated procedure well  penile dressing in place is C/D/I, NORAH drain with serosanguinous drainge  Extremities: normal rom, no edema, calf tenderness  Skin: no rash

## 2018-07-21 NOTE — DISCHARGE NOTE ADULT - PLAN OF CARE
healing of wounds and normal sexual function NORAH drain-empty drain daily and as needed as instructed  follow up with DR Epperson on 7/23/18  call doctor if fever, severe pain, severe swelling, or bleeding

## 2018-07-21 NOTE — PROGRESS NOTE ADULT - ATTENDING COMMENTS
Patient doing well. Pain present but manageable.  no fevers overnight  NORAH drain with moderately high output -- will send home with NORAH to drainage.  he will record output and bring me the results on monday to remove the NORAH in the office  will go home with bactrim x 7 days total and pain medication  will learn to use the device in 4 weeks.

## 2018-07-21 NOTE — DISCHARGE NOTE ADULT - NS AS ACTIVITY OBS
Showering allowed/Walking-Indoors allowed/No Heavy lifting/straining/Do not drive or operate machinery/Walking-Outdoors allowed/Stairs allowed

## 2018-07-23 ENCOUNTER — APPOINTMENT (OUTPATIENT)
Dept: UROLOGY | Facility: CLINIC | Age: 63
End: 2018-07-23
Payer: COMMERCIAL

## 2018-07-23 VITALS
HEART RATE: 84 BPM | BODY MASS INDEX: 37.59 KG/M2 | DIASTOLIC BLOOD PRESSURE: 74 MMHG | WEIGHT: 248 LBS | SYSTOLIC BLOOD PRESSURE: 138 MMHG | HEIGHT: 68 IN

## 2018-07-23 PROBLEM — E78.00 PURE HYPERCHOLESTEROLEMIA, UNSPECIFIED: Chronic | Status: ACTIVE | Noted: 2018-07-06

## 2018-07-23 PROBLEM — E11.9 TYPE 2 DIABETES MELLITUS WITHOUT COMPLICATIONS: Chronic | Status: ACTIVE | Noted: 2018-07-06

## 2018-07-23 PROBLEM — E03.9 HYPOTHYROIDISM, UNSPECIFIED: Chronic | Status: ACTIVE | Noted: 2018-07-06

## 2018-07-23 PROBLEM — F52.21 MALE ERECTILE DISORDER: Chronic | Status: ACTIVE | Noted: 2018-07-06

## 2018-07-23 PROBLEM — M54.9 DORSALGIA, UNSPECIFIED: Chronic | Status: ACTIVE | Noted: 2018-07-06

## 2018-07-23 PROBLEM — E66.9 OBESITY, UNSPECIFIED: Chronic | Status: ACTIVE | Noted: 2018-07-06

## 2018-07-23 PROCEDURE — 99213 OFFICE O/P EST LOW 20 MIN: CPT

## 2018-07-23 PROCEDURE — 99024 POSTOP FOLLOW-UP VISIT: CPT

## 2018-07-23 RX ORDER — DOCUSATE SODIUM 100 MG
100 CAPSULE ORAL 3 TIMES DAILY
Qty: 30 | Refills: 0 | Status: ACTIVE | COMMUNITY
Start: 2018-07-23 | End: 1900-01-01

## 2018-07-24 RX ORDER — HYDROCODONE BITARTRATE AND ACETAMINOPHEN 10; 325 MG/1; MG/1
10-325 TABLET ORAL
Qty: 15 | Refills: 0 | Status: ACTIVE | COMMUNITY
Start: 2018-07-21

## 2018-07-24 RX ORDER — SULFAMETHOXAZOLE AND TRIMETHOPRIM 800; 160 MG/1; MG/1
800-160 TABLET ORAL
Qty: 14 | Refills: 0 | Status: ACTIVE | COMMUNITY
Start: 2018-07-21

## 2018-07-24 NOTE — LETTER BODY
[Dear  ___] : Dear  [unfilled], [Consult Letter:] : I had the pleasure of evaluating your patient, [unfilled]. [Please see my note below.] : Please see my note below. [Consult Closing:] : Thank you very much for allowing me to participate in the care of this patient.  If you have any questions, please do not hesitate to contact me. [Sincerely,] : Sincerely, [FreeTextEntry3] : Thomas Epperson MD\par Director of Male Sexual Dysfunction and Urologic Prosthetics

## 2018-07-24 NOTE — ASSESSMENT
[FreeTextEntry1] : This is a 63 year male who presents pod #3 after IPP\par NORAH was left in place as he had somewhat high output but the output decreased to a minimal amount over the prior day.\par There is some swelling and bruising that is not beyond what is expected\par no fevers or chills\par \par Also has 50g prostate with LUTS>  nocturia 3-4 times per night.  per patient not terribly bothersome and would prefer to avoid treatment at this time.\par \par Constipated since surgery - asks for stool softener

## 2018-07-24 NOTE — PHYSICAL EXAM
[General Appearance - Well Developed] : well developed [General Appearance - Well Nourished] : well nourished [Normal Appearance] : normal appearance [Well Groomed] : well groomed [General Appearance - In No Acute Distress] : no acute distress [Abdomen Soft] : soft [Abdomen Tenderness] : non-tender [Costovertebral Angle Tenderness] : no ~M costovertebral angle tenderness [Urethral Meatus] : meatus normal [FreeTextEntry1] : bruising and swelling to scrotum as expected\par left testicular nodule not painful\par LELE showed about 50g prostate no nodules [Skin Color & Pigmentation] : normal skin color and pigmentation [Edema] : no peripheral edema [] : no respiratory distress [Respiration, Rhythm And Depth] : normal respiratory rhythm and effort [Exaggerated Use Of Accessory Muscles For Inspiration] : no accessory muscle use [Oriented To Time, Place, And Person] : oriented to person, place, and time [Affect] : the affect was normal [Mood] : the mood was normal [Not Anxious] : not anxious [Normal Station and Gait] : the gait and station were normal for the patient's age [No Focal Deficits] : no focal deficits

## 2018-07-24 NOTE — HISTORY OF PRESENT ILLNESS
[FreeTextEntry1] : This is a 63 year male who presents pod #3 after IPP\par NORAH was left in place as he had somewhat high output but the output decreased to a minimal amount over the prior day.\par There is some swelling and bruising that is not beyond what is expected\par no fevers or chills\par \par Also has 50g prostate with LUTS>  nocturia 3-4 times per night.  per patient not terribly bothersome and would prefer to avoid treatment at this time.

## 2018-07-26 DIAGNOSIS — Z88.0 ALLERGY STATUS TO PENICILLIN: ICD-10-CM

## 2018-07-26 DIAGNOSIS — E11.9 TYPE 2 DIABETES MELLITUS WITHOUT COMPLICATIONS: ICD-10-CM

## 2018-07-26 DIAGNOSIS — E03.9 HYPOTHYROIDISM, UNSPECIFIED: ICD-10-CM

## 2018-07-26 DIAGNOSIS — Z79.84 LONG TERM (CURRENT) USE OF ORAL HYPOGLYCEMIC DRUGS: ICD-10-CM

## 2018-07-26 DIAGNOSIS — M54.9 DORSALGIA, UNSPECIFIED: ICD-10-CM

## 2018-07-26 DIAGNOSIS — N52.1 ERECTILE DYSFUNCTION DUE TO DISEASES CLASSIFIED ELSEWHERE: ICD-10-CM

## 2018-07-26 DIAGNOSIS — I77.1 STRICTURE OF ARTERY: ICD-10-CM

## 2018-07-26 DIAGNOSIS — E66.9 OBESITY, UNSPECIFIED: ICD-10-CM

## 2018-07-26 DIAGNOSIS — E78.00 PURE HYPERCHOLESTEROLEMIA, UNSPECIFIED: ICD-10-CM

## 2018-08-02 DIAGNOSIS — N52.01 ERECTILE DYSFUNCTION DUE TO ARTERIAL INSUFFICIENCY: ICD-10-CM

## 2018-08-02 DIAGNOSIS — Z79.82 LONG TERM (CURRENT) USE OF ASPIRIN: ICD-10-CM

## 2018-08-07 ENCOUNTER — APPOINTMENT (OUTPATIENT)
Dept: UROLOGY | Facility: CLINIC | Age: 63
End: 2018-08-07
Payer: COMMERCIAL

## 2018-08-07 VITALS
BODY MASS INDEX: 37.59 KG/M2 | DIASTOLIC BLOOD PRESSURE: 72 MMHG | HEART RATE: 95 BPM | WEIGHT: 248 LBS | HEIGHT: 68 IN | SYSTOLIC BLOOD PRESSURE: 118 MMHG

## 2018-08-07 DIAGNOSIS — R35.1 NOCTURIA: ICD-10-CM

## 2018-08-07 PROCEDURE — 99024 POSTOP FOLLOW-UP VISIT: CPT

## 2018-08-07 RX ORDER — DIPHENHYDRAMINE HCL 25 MG
25 CAPSULE ORAL
Qty: 40 | Refills: 0 | Status: ACTIVE | COMMUNITY
Start: 2018-07-31

## 2018-08-07 RX ORDER — FAMOTIDINE 40 MG/1
40 TABLET, FILM COATED ORAL
Qty: 10 | Refills: 0 | Status: ACTIVE | COMMUNITY
Start: 2018-07-31

## 2018-08-07 RX ORDER — PREDNISONE 20 MG/1
20 TABLET ORAL
Qty: 10 | Refills: 0 | Status: ACTIVE | COMMUNITY
Start: 2018-07-31

## 2018-08-29 ENCOUNTER — APPOINTMENT (OUTPATIENT)
Dept: UROLOGY | Facility: CLINIC | Age: 63
End: 2018-08-29
Payer: COMMERCIAL

## 2018-08-29 VITALS
BODY MASS INDEX: 37.59 KG/M2 | HEIGHT: 68 IN | HEART RATE: 111 BPM | DIASTOLIC BLOOD PRESSURE: 65 MMHG | SYSTOLIC BLOOD PRESSURE: 122 MMHG | WEIGHT: 248 LBS

## 2018-08-29 DIAGNOSIS — K59.00 CONSTIPATION, UNSPECIFIED: ICD-10-CM

## 2018-08-29 DIAGNOSIS — N13.8 BENIGN PROSTATIC HYPERPLASIA WITH LOWER URINARY TRACT SYMPMS: ICD-10-CM

## 2018-08-29 DIAGNOSIS — N40.1 BENIGN PROSTATIC HYPERPLASIA WITH LOWER URINARY TRACT SYMPMS: ICD-10-CM

## 2018-08-29 PROCEDURE — 99024 POSTOP FOLLOW-UP VISIT: CPT

## 2018-09-04 PROBLEM — N40.1 BPH WITH OBSTRUCTION/LOWER URINARY TRACT SYMPTOMS: Status: ACTIVE | Noted: 2018-05-23

## 2018-09-04 PROBLEM — K59.00 CONSTIPATION: Status: ACTIVE | Noted: 2018-07-23

## 2018-09-04 NOTE — ASSESSMENT
[FreeTextEntry1] : This is a 63 year male who presents about 7 weeks after IPP\par no fevers or chills\par Pt had a large scrotum to begin with, the pump is easily found and works well though pt is sore with activation which is expected though has improved since the last visit.  \par Today we went over the technique to pump the device and he was better able to activate it and de-activate it\par \par Also has 50g prostate with LUTS> nocturia 3-4 times per night. per patient not terribly bothersome.\par Constipation since surgery has resolved with colace that i prescribed\par

## 2018-09-04 NOTE — HISTORY OF PRESENT ILLNESS
[FreeTextEntry1] : This is a 63 year male who presents about 7 weeks after IPP\par no fevers or chills\par Pt had a large scrotum to begin with, the pump is easily found and works well though pt is sore with activation which is expected though has improved since the last visit.  \par Today we went over the technique to pump the device and he was better able to activate it and de-activate it\par \par Also has 50g prostate with LUTS> nocturia 3-4 times per night. per patient not terribly bothersome.\par Constipation since surgery has resolved with colace that i prescribed\par \par

## 2018-09-04 NOTE — PHYSICAL EXAM
[General Appearance - Well Developed] : well developed [General Appearance - Well Nourished] : well nourished [Normal Appearance] : normal appearance [Well Groomed] : well groomed [General Appearance - In No Acute Distress] : no acute distress [Abdomen Soft] : soft [Abdomen Tenderness] : non-tender [Costovertebral Angle Tenderness] : no ~M costovertebral angle tenderness [Urethral Meatus] : meatus normal [FreeTextEntry1] : bruising and swelling to scrotum has resolved. wound healing well. scrotum is tender - without normal limits. pump easily found\par left testicular nodule not painful\par LELE showed about 50g prostate no nodules [Skin Color & Pigmentation] : normal skin color and pigmentation [Edema] : no peripheral edema [] : no respiratory distress [Respiration, Rhythm And Depth] : normal respiratory rhythm and effort [Exaggerated Use Of Accessory Muscles For Inspiration] : no accessory muscle use [Oriented To Time, Place, And Person] : oriented to person, place, and time [Affect] : the affect was normal [Mood] : the mood was normal [Not Anxious] : not anxious [Normal Station and Gait] : the gait and station were normal for the patient's age [No Focal Deficits] : no focal deficits

## 2018-09-19 ENCOUNTER — APPOINTMENT (OUTPATIENT)
Dept: UROLOGY | Facility: CLINIC | Age: 63
End: 2018-09-19
Payer: COMMERCIAL

## 2018-09-19 VITALS
DIASTOLIC BLOOD PRESSURE: 66 MMHG | HEART RATE: 97 BPM | HEIGHT: 68 IN | BODY MASS INDEX: 37.59 KG/M2 | WEIGHT: 248 LBS | SYSTOLIC BLOOD PRESSURE: 111 MMHG

## 2018-09-19 DIAGNOSIS — N52.01 ERECTILE DYSFUNCTION DUE TO ARTERIAL INSUFFICIENCY: ICD-10-CM

## 2018-09-19 PROCEDURE — 99024 POSTOP FOLLOW-UP VISIT: CPT

## 2018-11-26 ENCOUNTER — APPOINTMENT (OUTPATIENT)
Dept: UROLOGY | Facility: CLINIC | Age: 63
End: 2018-11-26

## 2019-01-30 ENCOUNTER — APPOINTMENT (OUTPATIENT)
Dept: UROLOGY | Facility: CLINIC | Age: 64
End: 2019-01-30

## 2019-03-13 ENCOUNTER — OUTPATIENT (OUTPATIENT)
Dept: OUTPATIENT SERVICES | Facility: HOSPITAL | Age: 64
LOS: 1 days | Discharge: HOME | End: 2019-03-13

## 2019-03-13 DIAGNOSIS — Z98.890 OTHER SPECIFIED POSTPROCEDURAL STATES: Chronic | ICD-10-CM

## 2019-03-13 DIAGNOSIS — G90.09 OTHER IDIOPATHIC PERIPHERAL AUTONOMIC NEUROPATHY: ICD-10-CM

## 2019-05-22 ENCOUNTER — RECORD ABSTRACTING (OUTPATIENT)
Age: 64
End: 2019-05-22

## 2019-05-22 DIAGNOSIS — G62.9 POLYNEUROPATHY, UNSPECIFIED: ICD-10-CM

## 2019-05-22 DIAGNOSIS — Z86.73 PERSONAL HISTORY OF TRANSIENT ISCHEMIC ATTACK (TIA), AND CEREBRAL INFARCTION W/OUT RESIDUAL DEFICITS: ICD-10-CM

## 2019-05-22 DIAGNOSIS — Z87.39 PERSONAL HISTORY OF OTHER DISEASES OF THE MUSCULOSKELETAL SYSTEM AND CONNECTIVE TISSUE: ICD-10-CM

## 2019-05-22 DIAGNOSIS — Z83.3 FAMILY HISTORY OF DIABETES MELLITUS: ICD-10-CM

## 2019-08-21 ENCOUNTER — APPOINTMENT (OUTPATIENT)
Dept: NEUROLOGY | Facility: CLINIC | Age: 64
End: 2019-08-21

## 2021-01-30 ENCOUNTER — OUTPATIENT (OUTPATIENT)
Dept: OUTPATIENT SERVICES | Facility: HOSPITAL | Age: 66
LOS: 1 days | Discharge: HOME | End: 2021-01-30

## 2021-01-30 DIAGNOSIS — Z11.59 ENCOUNTER FOR SCREENING FOR OTHER VIRAL DISEASES: ICD-10-CM

## 2021-01-30 DIAGNOSIS — Z98.890 OTHER SPECIFIED POSTPROCEDURAL STATES: Chronic | ICD-10-CM

## 2021-08-15 ENCOUNTER — OUTPATIENT (OUTPATIENT)
Dept: OUTPATIENT SERVICES | Facility: HOSPITAL | Age: 66
LOS: 1 days | Discharge: HOME | End: 2021-08-15

## 2021-08-15 DIAGNOSIS — Z11.59 ENCOUNTER FOR SCREENING FOR OTHER VIRAL DISEASES: ICD-10-CM

## 2021-08-15 DIAGNOSIS — Z98.890 OTHER SPECIFIED POSTPROCEDURAL STATES: Chronic | ICD-10-CM

## 2021-08-18 ENCOUNTER — OUTPATIENT (OUTPATIENT)
Dept: OUTPATIENT SERVICES | Facility: HOSPITAL | Age: 66
LOS: 1 days | Discharge: HOME | End: 2021-08-18

## 2021-08-18 VITALS
RESPIRATION RATE: 17 BRPM | WEIGHT: 231.93 LBS | HEIGHT: 69 IN | DIASTOLIC BLOOD PRESSURE: 69 MMHG | SYSTOLIC BLOOD PRESSURE: 126 MMHG | TEMPERATURE: 98 F | HEART RATE: 82 BPM | OXYGEN SATURATION: 96 %

## 2021-08-18 VITALS — HEART RATE: 82 BPM | DIASTOLIC BLOOD PRESSURE: 85 MMHG | SYSTOLIC BLOOD PRESSURE: 142 MMHG

## 2021-08-18 DIAGNOSIS — Z98.890 OTHER SPECIFIED POSTPROCEDURAL STATES: Chronic | ICD-10-CM

## 2021-08-18 LAB — GLUCOSE BLDC GLUCOMTR-MCNC: 147 MG/DL — HIGH (ref 70–99)

## 2021-08-18 RX ORDER — SEMAGLUTIDE 0.68 MG/ML
0 INJECTION, SOLUTION SUBCUTANEOUS
Qty: 0 | Refills: 0 | DISCHARGE

## 2021-08-18 RX ORDER — ASPIRIN/CALCIUM CARB/MAGNESIUM 324 MG
1 TABLET ORAL
Qty: 0 | Refills: 0 | DISCHARGE

## 2021-08-18 RX ORDER — SITAGLIPTIN AND METFORMIN HYDROCHLORIDE 500; 50 MG/1; MG/1
1 TABLET, FILM COATED ORAL
Qty: 0 | Refills: 0 | DISCHARGE

## 2021-08-18 RX ORDER — ZOLPIDEM TARTRATE 10 MG/1
1 TABLET ORAL
Qty: 0 | Refills: 0 | DISCHARGE

## 2021-08-18 RX ORDER — METFORMIN HYDROCHLORIDE 850 MG/1
0 TABLET ORAL
Qty: 0 | Refills: 0 | DISCHARGE

## 2021-08-18 NOTE — ASU PREOP CHECKLIST - NSWEIGHTCALCTOOLDRUG_GEN_A_CORE
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  - Duonebs QID  - Antibiotics per ID (off)  - PT/OT  - Nutrition per PEG     SUBJECTIVE:  Ms. Junior is a 61 year old female with past history of hypertension, tobacco use, and recent CVAs. She was admitted on 11/18/19 after transfer from [facility]. She was managed there for multifocal brainstem strokes. She was intubated there for failure to manage her own secretions and eventually trached. She has a 6 shiley cuffed trach in that is current uncuffed. She is tolerated TM 24/7. She received antibiotics for MSSA and strep pneumonia.    Interval History:  11/20: On TM. No significant secretions. Unable to follow commands. Tracks.  11/21: On TM 28% 24/7. Mild rhonchi today. Duo and mucomyst. No secretions.  11/22: No acute events; remains stable on TM 24/7; will consider trial of capping today; secretions minimal and thin so will d/c mucomyst  11/23: pt on PMV, minimal interaction , non verbal   11/25:  on 28% trach mask PMV.  11/26:  trach mask 24/7, no changes in mental status  11/27: TM 28%. No secretions.  11/29: TM 28% 24/7. PMV. No secretions.  11/30: TM 28% 24/7. PMV. Sleeping.  12/2   capped during day 1 L at night  12/3  overnight capped on room air  12/6 capped 24/7     HISTORIES:  ALLERGIES:  No Known Allergies   Social History     Tobacco Use   • Smoking status: Not on file   Substance Use Topics   • Alcohol use: Not on file     No family history on file.    PAST MEDICAL HISTORY:  Hypertension  Hyperlipidemia  Tobacco  use    PAST SURGICAL HISTORY:  Reviewed    SOCIAL HISTORY:  Tobacco use  Lives with significant other. Previously independent in ADLs    FAMILY HISTORY:  Reviewed    MEDICATIONS:  Naficillin, Cefazolin, Mucomyst, Duonebs, ASA, Statin, Plavix, Erythromycin, Metoprolol, Sertraline    REVIEW OF SYSTEMS:  All systems reviewed and are negative with the exception of the findings noted in the HPI. Limited due to patient's locked in neurological status.      OBJECTIVE:  PHYSICAL EXAMINATION:  VITALS:   Afebrile /78 HR 97  General:  No acute distress.  Skin:  Skin color, texture, and turgor normal.  No rashes or lesions. No cyanosis or clubbing.  Head:  Normocephalic, without obvious abnormality, atraumatic.  Eyes:  Patch over one eye.  Ears:  Normal external both ears.  Nose:  Nares normal. Septum midline. Mucosa normal. No drainage or sinus tenderness.  Throat:  Lips, mucosa, and tongue normal; teeth and gums normal. Normal posterior oropharynx.  Neck: Supple, symmetrical.  Trachea midline. No adenopathy; Thyroid has no enlargement, tenderness, or nodules. No carotid bruit or JVD. No secretions from trach. Trach collar.  Lungs: Clear bilateral. Distant.  Chest wall:  No tenderness or deformity.  Heart::  Regular rate and rhythm. S1 and S2 normal. No murmur, rub or gallop. No peripheral edema.  Abdomen:  Soft, nontender, bowel sounds active all four quadrants.  No masses or hepatomegaly or splenomegaly.  Extremities:   Normal, atraumatic, no cyanosis or edema.  Pulses:  2+ and symmetric all extremities.  Lymph Nodes:  Cervical, supraclavicular and axillary nodes normal.  Musculoskeletal: No cyanosis clubbing edema or petechiae. Normal symmetry. Normal range of motion. Normal muscle strength and tone.  Neurologic:  Not following commands.    LABORATORY DATA:    Chest xray 11/24/19 personally reviewed   Tracheostomy tube and tracheostomy mask in place.   Platelike atelectasis in the right lower lung with minimal  probable  atelectasis in the left infrahilar region. No effusion.   The cardiac and mediastinal contours appear unremarkable.    No pneumothorax.         Recent Labs   Lab 11/29/19 0430   WBC 11.3*   HCT 35.3*   HGB 11.5*        Recent Labs   Lab 12/04/19 0420 11/29/19 0430   SODIUM 141 137   POTASSIUM 3.6 4.1   CHLORIDE 105 102   CO2 30 30   ANIONGAP 10 9*   GLUCOSE 103* 92   BUN 17 20   CREATININE 0.37* 0.43*   GFRA >90 >90   GFRNA >90 >90   BCRAT 46* 47*   CALCIUM 9.8 9.6   BILIRUBIN 0.3  --    AST 23  --    GPT 46  --    ALKPT 178*  --    TOTPROTEIN 7.1  --    ALBUMIN 2.7*  --    GLOB 4.4*  --    AGR 0.6*  --      Recent Labs   Lab 12/04/19 0420 11/29/19 0430   SODIUM 141 137   POTASSIUM 3.6 4.1   CHLORIDE 105 102   CO2 30 30   ANIONGAP 10 9*   GLUCOSE 103* 92   BUN 17 20   CREATININE 0.37* 0.43*   GFRA >90 >90   GFRNA >90 >90   BCRAT 46* 47*   CALCIUM 9.8 9.6     Recent Labs   Lab 12/04/19 0420   ALBUMIN 2.7*   BILIRUBIN 0.3   ALKPT 178*   GPT 46   AST 23   TOTPROTEIN 7.1         Kev Swift MD      Please page provider during normal business hours.   Use answering service [phone] after 5 pm, before 8 am, and weekends for on-call provider      used

## 2021-08-18 NOTE — ASU PATIENT PROFILE, ADULT - NSICDXPASTMEDICALHX_GEN_ALL_CORE_FT
PAST MEDICAL HISTORY:  Back pain     DM (diabetes mellitus)     Erectile dysfunction of non-organic origin     Hypercholesteremia     Hypothyroidism     Obesity (BMI 35.0-39.9 without comorbidity)

## 2021-08-18 NOTE — ASU PATIENT PROFILE, ADULT - FALL HARM RISK CONCLUSION
Labs reviewed with a significant decrease in HGB from 10.4 in March to 7.9 yesterday. Will have RN call patient nad daughter with results and have her call PCP to determine whether its necessary she be directed to the ED with histiry of GIB.  This is likely Range: 0.10 - 1.00 x10(3) uL 0.64   Eosinophils Absolute Latest Ref Range: 0.00 - 0.70 x10(3) uL 0.20   Basophils Absolute Latest Ref Range: 0.00 - 0.20 x10(3) uL 0.04   Immature Granulocyte Absolute Latest Ref Range: 0.00 - 1.00 x10(3) uL 0.03   Neutro[employer] Universal Safety Interventions

## 2021-08-22 DIAGNOSIS — Z79.82 LONG TERM (CURRENT) USE OF ASPIRIN: ICD-10-CM

## 2021-08-22 DIAGNOSIS — E66.9 OBESITY, UNSPECIFIED: ICD-10-CM

## 2021-08-22 DIAGNOSIS — H25.12 AGE-RELATED NUCLEAR CATARACT, LEFT EYE: ICD-10-CM

## 2021-08-22 DIAGNOSIS — Z88.0 ALLERGY STATUS TO PENICILLIN: ICD-10-CM

## 2021-08-22 DIAGNOSIS — E11.9 TYPE 2 DIABETES MELLITUS WITHOUT COMPLICATIONS: ICD-10-CM

## 2021-08-22 DIAGNOSIS — E03.9 HYPOTHYROIDISM, UNSPECIFIED: ICD-10-CM

## 2021-08-22 DIAGNOSIS — E78.00 PURE HYPERCHOLESTEROLEMIA, UNSPECIFIED: ICD-10-CM

## 2021-08-22 DIAGNOSIS — Z79.84 LONG TERM (CURRENT) USE OF ORAL HYPOGLYCEMIC DRUGS: ICD-10-CM

## 2022-05-03 ENCOUNTER — EMERGENCY (EMERGENCY)
Facility: HOSPITAL | Age: 67
LOS: 0 days | Discharge: HOME | End: 2022-05-03
Attending: EMERGENCY MEDICINE | Admitting: EMERGENCY MEDICINE
Payer: MEDICARE

## 2022-05-03 VITALS
RESPIRATION RATE: 18 BRPM | HEART RATE: 85 BPM | OXYGEN SATURATION: 98 % | HEIGHT: 69 IN | TEMPERATURE: 98 F | DIASTOLIC BLOOD PRESSURE: 69 MMHG | SYSTOLIC BLOOD PRESSURE: 127 MMHG

## 2022-05-03 DIAGNOSIS — R53.1 WEAKNESS: ICD-10-CM
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
## 2022-05-03 DIAGNOSIS — M54.50 LOW BACK PAIN, UNSPECIFIED: ICD-10-CM

## 2022-05-03 DIAGNOSIS — Z79.82 LONG TERM (CURRENT) USE OF ASPIRIN: ICD-10-CM

## 2022-05-03 DIAGNOSIS — M54.9 DORSALGIA, UNSPECIFIED: ICD-10-CM

## 2022-05-03 DIAGNOSIS — E78.5 HYPERLIPIDEMIA, UNSPECIFIED: ICD-10-CM

## 2022-05-03 DIAGNOSIS — E03.9 HYPOTHYROIDISM, UNSPECIFIED: ICD-10-CM
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
## 2022-05-03 DIAGNOSIS — R42 DIZZINESS AND GIDDINESS: ICD-10-CM

## 2022-05-03 DIAGNOSIS — Z98.890 OTHER SPECIFIED POSTPROCEDURAL STATES: Chronic | ICD-10-CM

## 2022-05-03 DIAGNOSIS — E11.40 TYPE 2 DIABETES MELLITUS WITH DIABETIC NEUROPATHY, UNSPECIFIED: ICD-10-CM
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
## 2022-05-03 DIAGNOSIS — Z88.0 ALLERGY STATUS TO PENICILLIN: ICD-10-CM

## 2022-05-03 DIAGNOSIS — Z20.822 CONTACT WITH AND (SUSPECTED) EXPOSURE TO COVID-19: ICD-10-CM

## 2022-05-03 DIAGNOSIS — Z79.84 LONG TERM (CURRENT) USE OF ORAL HYPOGLYCEMIC DRUGS: ICD-10-CM

## 2022-05-03 LAB
ALBUMIN SERPL ELPH-MCNC: 4.3 G/DL — SIGNIFICANT CHANGE UP (ref 3.5–5.2)
ALP SERPL-CCNC: 112 U/L — SIGNIFICANT CHANGE UP (ref 30–115)
ALT FLD-CCNC: 19 U/L — SIGNIFICANT CHANGE UP (ref 0–41)
ANION GAP SERPL CALC-SCNC: 10 MMOL/L — SIGNIFICANT CHANGE UP (ref 7–14)
AST SERPL-CCNC: 25 U/L — SIGNIFICANT CHANGE UP (ref 0–41)
BASOPHILS # BLD AUTO: 0.06 K/UL — SIGNIFICANT CHANGE UP (ref 0–0.2)
BASOPHILS NFR BLD AUTO: 0.5 % — SIGNIFICANT CHANGE UP (ref 0–1)
BILIRUB SERPL-MCNC: 0.3 MG/DL — SIGNIFICANT CHANGE UP (ref 0.2–1.2)
BUN SERPL-MCNC: 22 MG/DL — HIGH (ref 10–20)
CALCIUM SERPL-MCNC: 9.4 MG/DL — SIGNIFICANT CHANGE UP (ref 8.5–10.1)
CHLORIDE SERPL-SCNC: 101 MMOL/L — SIGNIFICANT CHANGE UP (ref 98–110)
CO2 SERPL-SCNC: 26 MMOL/L — SIGNIFICANT CHANGE UP (ref 17–32)
CREAT SERPL-MCNC: 1.2 MG/DL — SIGNIFICANT CHANGE UP (ref 0.7–1.5)
EGFR: 66 ML/MIN/1.73M2 — SIGNIFICANT CHANGE UP
EOSINOPHIL # BLD AUTO: 0.33 K/UL — SIGNIFICANT CHANGE UP (ref 0–0.7)
EOSINOPHIL NFR BLD AUTO: 3 % — SIGNIFICANT CHANGE UP (ref 0–8)
GLUCOSE SERPL-MCNC: 126 MG/DL — HIGH (ref 70–99)
HCT VFR BLD CALC: 42.8 % — SIGNIFICANT CHANGE UP (ref 42–52)
HGB BLD-MCNC: 14.1 G/DL — SIGNIFICANT CHANGE UP (ref 14–18)
IMM GRANULOCYTES NFR BLD AUTO: 0.2 % — SIGNIFICANT CHANGE UP (ref 0.1–0.3)
LYMPHOCYTES # BLD AUTO: 39 % — SIGNIFICANT CHANGE UP (ref 20.5–51.1)
LYMPHOCYTES # BLD AUTO: 4.25 K/UL — HIGH (ref 1.2–3.4)
MAGNESIUM SERPL-MCNC: 1.7 MG/DL — LOW (ref 1.8–2.4)
MCHC RBC-ENTMCNC: 27.8 PG — SIGNIFICANT CHANGE UP (ref 27–31)
MCHC RBC-ENTMCNC: 32.9 G/DL — SIGNIFICANT CHANGE UP (ref 32–37)
MCV RBC AUTO: 84.4 FL — SIGNIFICANT CHANGE UP (ref 80–94)
MONOCYTES # BLD AUTO: 0.69 K/UL — HIGH (ref 0.1–0.6)
MONOCYTES NFR BLD AUTO: 6.3 % — SIGNIFICANT CHANGE UP (ref 1.7–9.3)
NEUTROPHILS # BLD AUTO: 5.56 K/UL — SIGNIFICANT CHANGE UP (ref 1.4–6.5)
NEUTROPHILS NFR BLD AUTO: 51 % — SIGNIFICANT CHANGE UP (ref 42.2–75.2)
NRBC # BLD: 0 /100 WBCS — SIGNIFICANT CHANGE UP (ref 0–0)
NT-PROBNP SERPL-SCNC: 97 PG/ML — SIGNIFICANT CHANGE UP (ref 0–300)
PLATELET # BLD AUTO: 288 K/UL — SIGNIFICANT CHANGE UP (ref 130–400)
POTASSIUM SERPL-MCNC: 4.5 MMOL/L — SIGNIFICANT CHANGE UP (ref 3.5–5)
POTASSIUM SERPL-SCNC: 4.5 MMOL/L — SIGNIFICANT CHANGE UP (ref 3.5–5)
PROT SERPL-MCNC: 7.4 G/DL — SIGNIFICANT CHANGE UP (ref 6–8)
RBC # BLD: 5.07 M/UL — SIGNIFICANT CHANGE UP (ref 4.7–6.1)
RBC # FLD: 13.2 % — SIGNIFICANT CHANGE UP (ref 11.5–14.5)
SODIUM SERPL-SCNC: 137 MMOL/L — SIGNIFICANT CHANGE UP (ref 135–146)
TROPONIN T SERPL-MCNC: <0.01 NG/ML — SIGNIFICANT CHANGE UP
WBC # BLD: 10.91 K/UL — HIGH (ref 4.8–10.8)
WBC # FLD AUTO: 10.91 K/UL — HIGH (ref 4.8–10.8)

## 2022-05-03 PROCEDURE — 71046 X-RAY EXAM CHEST 2 VIEWS: CPT | Mod: 26

## 2022-05-03 PROCEDURE — 99285 EMERGENCY DEPT VISIT HI MDM: CPT

## 2022-05-03 PROCEDURE — 93010 ELECTROCARDIOGRAM REPORT: CPT

## 2022-05-03 RX ORDER — MAGNESIUM SULFATE 500 MG/ML
2 VIAL (ML) INJECTION ONCE
Refills: 0 | Status: COMPLETED | OUTPATIENT
Start: 2022-05-03 | End: 2022-05-03

## 2022-05-03 RX ORDER — SODIUM CHLORIDE 9 MG/ML
1000 INJECTION INTRAMUSCULAR; INTRAVENOUS; SUBCUTANEOUS ONCE
Refills: 0 | Status: COMPLETED | OUTPATIENT
Start: 2022-05-03 | End: 2022-05-03

## 2022-05-03 RX ADMIN — Medication 150 GRAM(S): at 22:10

## 2022-05-03 RX ADMIN — SODIUM CHLORIDE 1000 MILLILITER(S): 9 INJECTION INTRAMUSCULAR; INTRAVENOUS; SUBCUTANEOUS at 19:54

## 2022-05-03 NOTE — ED PROVIDER NOTE - CARE PROVIDER_API CALL
your pmd in 1-3 days,   Phone: (   )    -  Fax: (   )    -  Follow Up Time:    your pmd in 1-3 days,   Phone: (   )    -  Fax: (   )    -  Follow Up Time:     Adolfo Padilla (MD)  Cardiovascular Disease; Interventional Cardiology  [address] [place] [address]  Phone: [phone]  Fax: [phone]  Follow Up Time: 1-3 Days

## 2022-05-03 NOTE — ED ADULT TRIAGE NOTE - CHIEF COMPLAINT QUOTE
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

## 2022-05-03 NOTE — ED PROVIDER NOTE - PROVIDER TOKENS
FREE:[LAST:[your pmd in 1-3 days],PHONE:[(   )    -],FAX:[(   )    -]] FREE:[LAST:[your pmd in 1-3 days],PHONE:[(   )    -],FAX:[(   )    -]],PROVIDER:[TOKEN:[[id number]],FOLLOWUP:[1-3 Days]]

## 2022-05-03 NOTE — ED PROVIDER NOTE - PATIENT PORTAL LINK FT
You can access the FollowMyHealth Patient Portal offered by [facility] by registering at the following website: [email or web address]. By joining [employer]’s FollowMyHealth portal, you will also be able to view your health information using other applications (apps) compatible with our system.

## 2022-05-03 NOTE — ED PROVIDER NOTE - CLINICAL SUMMARY MEDICAL DECISION MAKING FREE TEXT BOX
Patient continues to feel well on reassessment.  Work-up is reassuring.  He is comfortable with discharge and follow-up with cardiology.  He will return to the ED for persistent or worsening symptoms.

## 2022-05-03 NOTE — ED PROVIDER NOTE - OBJECTIVE STATEMENT
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   back pain is worse with movement/better with rest.   no loc/head injury/cp/sob/ab pain/n/v/d.   denies saddle anesthesia, bowel/bladder dysfunction, difficulty ambulating, paraesthesias, direct trauma, hx IVDA, recent injections, weakness, unexplained wt loss.

## 2022-05-03 NOTE — ED PROVIDER NOTE - PHYSICAL EXAMINATION
PHYSICAL EXAM:    GENERAL: Alert, appears stated age, well appearing, non-toxic  SKIN: Warm, pink and dry.  HEAD: NC, AT, no step offs   EYE: Normal lids/conjunctiva, PERRL, EOMI  ENT: Normal hearing, patent oropharynx without erythema or exudate  NECK: +supple. No meningismus, or JVD, +Trachea midline. no tenderness/step offs.   Pulm: Bilateral BS, normal resp effort, no wheezes, stridor, or retractions  CV: RRR, no M/R/G, 2+and = radial pulses  Abd: soft, non-tender, non-distended, no rebound/guarding.   Mskel: no erythema, cyanosis, edema. no calf tenderness. no spinal ttp. FROM to b/l ue/le  Neuro: AAOx3, no sensory/motor deficits, CN 2-12 intact. No speech slurring, pronator drift, facial asymmetry. normal finger-to-nose b/l. 5/5 strength throughout. normal gait. negative romberg.

## 2022-05-03 NOTE — ED PROVIDER NOTE - NSDCPRINTRESULTS_ED_ALL_ED
How Severe Is Your Skin Lesion?: moderate Has Your Skin Lesion Been Treated?: not been treated Is This A New Presentation, Or A Follow-Up?: Skin Lesion Patient requests all Lab, Cardiology, and Radiology Results on their Discharge Instructions

## 2022-05-03 NOTE — ED PROVIDER NOTE - PROGRESS NOTE DETAILS
NAZARIO TREVINO: counseled on need for cardio/pcp follow up. pt remains asx. rpt neuro exam wnl. Reviewed all results and necessity for follow up. Counseled on red flags and to return for them.  Patient appears well on discharge.

## 2022-05-03 NOTE — ED PROVIDER NOTE - NS ED ATTENDING STATEMENT MOD
This was a shared visit with the MILLER. I reviewed and verified the documentation and independently performed the documented:

## 2022-05-03 NOTE — ED PROVIDER NOTE - ATTENDING APP SHARED VISIT CONTRIBUTION OF CARE
59-year-old man, history of back pain, hyperlipidemia, diabetes, diabetic neuropathy complains of low back pain radiating down, consistent with his sciatic pain, which she has had before but suddenly worsened, causing him to feel dizzy and fall onto his buttocks.  He was at work as an umpire at a baseball game, was squatting for an extended period of time when the episode happened.  He denies red flags for cord compression, and is back to baseline in the ED with no complaints.  Vital signs, exam as noted, patient is nontoxic-appearing, lungs clear, CV S1-S2, RRR, no murmur.  Abdomen soft, nontender.  No peripheral edema.  Symptoms seem orthostatic or vagal in origin, will check labs, EKG, hydrate, reassess.

## 2022-05-04 LAB — SARS-COV-2 RNA SPEC QL NAA+PROBE: SIGNIFICANT CHANGE UP

## 2022-08-05 NOTE — ASU PATIENT PROFILE, ADULT - NS TRANSFER PATIENT BELONGINGS
HARLEEN Joy  Postpartum Note    Subjective   Postpartum Day 1:  Spontaneous Vaginal Delivery    Patient without complaints. Her pain is well controlled with nonsteroidal anti-inflammatory drugs and prescribed pain medications. She is ambulating well.  Patient describes her bleeding as thin lochia. Pt had elevated blood pressures overnight. Labetolol 200mg bid ordered per Dr Diamond. BP this am 143/91. Pt denies HA, visual changes, or RUQ pain. Pt takes Subutex 8mg. Unable to get home meds until later today. One time dose ordered then pt to use home meds.     Breastfeeding: declines.    Objective     Vitals:  Vitals:    08/05/22 0324 08/05/22 0333 08/05/22 0630 08/05/22 0750   BP: (!) 175/109 152/97 125/82    BP Location: Right arm Left arm Left arm    Patient Position: Sitting Lying Sitting    Pulse:       Resp:       Temp:    98.3 °F (36.8 °C)   TempSrc:       SpO2:       Weight:       Height:           Physical Exam:  General:  Alert and oriented x3. No acute distress.  Abdomen: abdomen is soft without significant tenderness, masses, organomegaly or guarding. Fundus: appropriate, firm, non tender  Incision: N/A  Skin: Warm, Dry  Extremities: Normal,  trace edema. Nontender     Labs:  Results from last 7 days   Lab Units 08/04/22  1518   WBC 10*3/mm3 7.90   HEMOGLOBIN g/dL 10.6*   HEMATOCRIT % 32.7*   PLATELETS 10*3/mm3 371            Feeding method: Breastfeeding Status: Yes     Blood Type: RH Positive        Assessment & Plan     Active Problems:    Pregnancy      Amy Murillo is Day 1  post-partum from a  Spontaneous Vaginal Delivery      Plan:  routine, continue present management, monitor BPs and plan d/c tomorrow. One time dose Subutex 8mg. Pt to have home meds brought in. Pt started on Labetolol 200mg BID.       Fanny Blackman, APRN  8/5/2022  10:03 EDT                  
Clothing/8

## 2022-10-30 ENCOUNTER — INPATIENT (INPATIENT)
Facility: HOSPITAL | Age: 67
LOS: 1 days | Discharge: HOME | End: 2022-11-01
Attending: HOSPITALIST | Admitting: FAMILY MEDICINE

## 2022-10-30 VITALS
DIASTOLIC BLOOD PRESSURE: 64 MMHG | WEIGHT: 250 LBS | HEART RATE: 99 BPM | RESPIRATION RATE: 16 BRPM | SYSTOLIC BLOOD PRESSURE: 120 MMHG | OXYGEN SATURATION: 98 % | HEIGHT: 69 IN | TEMPERATURE: 98 F

## 2022-10-30 DIAGNOSIS — Z98.890 OTHER SPECIFIED POSTPROCEDURAL STATES: Chronic | ICD-10-CM

## 2022-10-30 LAB
ALBUMIN SERPL ELPH-MCNC: 3.7 G/DL — SIGNIFICANT CHANGE UP (ref 3.5–5.2)
ALP SERPL-CCNC: 89 U/L — SIGNIFICANT CHANGE UP (ref 30–115)
ALT FLD-CCNC: 16 U/L — SIGNIFICANT CHANGE UP (ref 0–41)
ANION GAP SERPL CALC-SCNC: 9 MMOL/L — SIGNIFICANT CHANGE UP (ref 7–14)
APTT BLD: 31 SEC — SIGNIFICANT CHANGE UP (ref 27–39.2)
AST SERPL-CCNC: 16 U/L — SIGNIFICANT CHANGE UP (ref 0–41)
BASE EXCESS BLDV CALC-SCNC: 2 MMOL/L — SIGNIFICANT CHANGE UP (ref -2–3)
BASOPHILS # BLD AUTO: 0.04 K/UL — SIGNIFICANT CHANGE UP (ref 0–0.2)
BASOPHILS NFR BLD AUTO: 0.3 % — SIGNIFICANT CHANGE UP (ref 0–1)
BILIRUB SERPL-MCNC: 0.3 MG/DL — SIGNIFICANT CHANGE UP (ref 0.2–1.2)
BUN SERPL-MCNC: 15 MG/DL — SIGNIFICANT CHANGE UP (ref 10–20)
CA-I SERPL-SCNC: 1.2 MMOL/L — SIGNIFICANT CHANGE UP (ref 1.15–1.33)
CALCIUM SERPL-MCNC: 9 MG/DL — SIGNIFICANT CHANGE UP (ref 8.4–10.4)
CHLORIDE SERPL-SCNC: 104 MMOL/L — SIGNIFICANT CHANGE UP (ref 98–110)
CO2 SERPL-SCNC: 27 MMOL/L — SIGNIFICANT CHANGE UP (ref 17–32)
CREAT SERPL-MCNC: 1 MG/DL — SIGNIFICANT CHANGE UP (ref 0.7–1.5)
EGFR: 82 ML/MIN/1.73M2 — SIGNIFICANT CHANGE UP
EOSINOPHIL # BLD AUTO: 0.13 K/UL — SIGNIFICANT CHANGE UP (ref 0–0.7)
EOSINOPHIL NFR BLD AUTO: 1.1 % — SIGNIFICANT CHANGE UP (ref 0–8)
GAS PNL BLDV: 136 MMOL/L — SIGNIFICANT CHANGE UP (ref 136–145)
GAS PNL BLDV: SIGNIFICANT CHANGE UP
GLUCOSE SERPL-MCNC: 102 MG/DL — HIGH (ref 70–99)
HCO3 BLDV-SCNC: 28 MMOL/L — SIGNIFICANT CHANGE UP (ref 22–29)
HCT VFR BLD CALC: 36 % — LOW (ref 42–52)
HCT VFR BLDA CALC: 37 % — LOW (ref 39–51)
HGB BLD CALC-MCNC: 12.2 G/DL — LOW (ref 12.6–17.4)
HGB BLD-MCNC: 12.1 G/DL — LOW (ref 14–18)
IMM GRANULOCYTES NFR BLD AUTO: 0.5 % — HIGH (ref 0.1–0.3)
INR BLD: 1.04 RATIO — SIGNIFICANT CHANGE UP (ref 0.65–1.3)
LACTATE BLDV-MCNC: 0.9 MMOL/L — SIGNIFICANT CHANGE UP (ref 0.5–2)
LYMPHOCYTES # BLD AUTO: 28.4 % — SIGNIFICANT CHANGE UP (ref 20.5–51.1)
LYMPHOCYTES # BLD AUTO: 3.33 K/UL — SIGNIFICANT CHANGE UP (ref 1.2–3.4)
MCHC RBC-ENTMCNC: 29.2 PG — SIGNIFICANT CHANGE UP (ref 27–31)
MCHC RBC-ENTMCNC: 33.6 G/DL — SIGNIFICANT CHANGE UP (ref 32–37)
MCV RBC AUTO: 87 FL — SIGNIFICANT CHANGE UP (ref 80–94)
MONOCYTES # BLD AUTO: 0.87 K/UL — HIGH (ref 0.1–0.6)
MONOCYTES NFR BLD AUTO: 7.4 % — SIGNIFICANT CHANGE UP (ref 1.7–9.3)
NEUTROPHILS # BLD AUTO: 7.3 K/UL — HIGH (ref 1.4–6.5)
NEUTROPHILS NFR BLD AUTO: 62.3 % — SIGNIFICANT CHANGE UP (ref 42.2–75.2)
NRBC # BLD: 0 /100 WBCS — SIGNIFICANT CHANGE UP (ref 0–0)
PCO2 BLDV: 50 MMHG — SIGNIFICANT CHANGE UP (ref 42–55)
PH BLDV: 7.36 — SIGNIFICANT CHANGE UP (ref 7.32–7.43)
PLATELET # BLD AUTO: 300 K/UL — SIGNIFICANT CHANGE UP (ref 130–400)
PO2 BLDV: 30 MMHG — SIGNIFICANT CHANGE UP
POTASSIUM BLDV-SCNC: 4.1 MMOL/L — SIGNIFICANT CHANGE UP (ref 3.5–5.1)
POTASSIUM SERPL-MCNC: 4.4 MMOL/L — SIGNIFICANT CHANGE UP (ref 3.5–5)
POTASSIUM SERPL-SCNC: 4.4 MMOL/L — SIGNIFICANT CHANGE UP (ref 3.5–5)
PROT SERPL-MCNC: 6.6 G/DL — SIGNIFICANT CHANGE UP (ref 6–8)
PROTHROM AB SERPL-ACNC: 11.9 SEC — SIGNIFICANT CHANGE UP (ref 9.95–12.87)
RBC # BLD: 4.14 M/UL — LOW (ref 4.7–6.1)
RBC # FLD: 13.4 % — SIGNIFICANT CHANGE UP (ref 11.5–14.5)
SAO2 % BLDV: 41.8 % — SIGNIFICANT CHANGE UP
SARS-COV-2 RNA SPEC QL NAA+PROBE: DETECTED
SODIUM SERPL-SCNC: 140 MMOL/L — SIGNIFICANT CHANGE UP (ref 135–146)
TROPONIN T SERPL-MCNC: <0.01 NG/ML — SIGNIFICANT CHANGE UP
WBC # BLD: 11.73 K/UL — HIGH (ref 4.8–10.8)
WBC # FLD AUTO: 11.73 K/UL — HIGH (ref 4.8–10.8)

## 2022-10-30 PROCEDURE — 93010 ELECTROCARDIOGRAM REPORT: CPT

## 2022-10-30 PROCEDURE — 0042T: CPT | Mod: MA

## 2022-10-30 PROCEDURE — 99285 EMERGENCY DEPT VISIT HI MDM: CPT

## 2022-10-30 PROCEDURE — 70450 CT HEAD/BRAIN W/O DYE: CPT | Mod: 26,MA,59

## 2022-10-30 PROCEDURE — 70498 CT ANGIOGRAPHY NECK: CPT | Mod: 26,MA

## 2022-10-30 PROCEDURE — 71045 X-RAY EXAM CHEST 1 VIEW: CPT | Mod: 26

## 2022-10-30 PROCEDURE — 70496 CT ANGIOGRAPHY HEAD: CPT | Mod: 26,MA

## 2022-10-30 NOTE — ED PROVIDER NOTE - CLINICAL SUMMARY MEDICAL DECISION MAKING FREE TEXT BOX
66 yo man with LE edema and slurring of speech starting about 2 hours ago.  Likely due to hypoglycemia.  But after dextrose, symptoms persist.  Patient also had head injury the day prior.  Otherwise GCS 15.  NIH score of 1 on exam.  Seen by neuro and admission recommended with MRI planned,.

## 2022-10-30 NOTE — ED ADULT NURSE NOTE - NSICDXFAMILYHX_GEN_ALL_CORE_FT
FAMILY HISTORY:  Father  Still living? No  Family history of aneurysm, Age at diagnosis: Age Unknown    Mother  Still living? No  Family history of lung cancer, Age at diagnosis: Age Unknown

## 2022-10-30 NOTE — ED PROVIDER NOTE - PHYSICAL EXAMINATION
VITAL SIGNS: I have reviewed nursing notes and confirm.  CONSTITUTIONAL: Patient is in no acute distress.  SKIN: Skin exam is warm and dry, no acute rash.  HEAD: Normocephalic; atraumatic.  EYES: PERRL, EOM intact; conjunctiva and sclera clear.  ENT: No nasal discharge; airway clear.   NECK: Supple; non tender.  CARD: S1, S2 normal; no murmurs, gallops, or rubs. Regular rate and rhythm.  RESP: Clear to auscultation bilaterally. No wheezes, rales or rhonchi.  ABD: Normal bowel sounds; soft; non-distended; non-tender.   EXT: Normal ROM. No edema.  LYMPH: No acute cervical adenopathy.  NEURO: Alert, oriented. Grossly unremarkable besides aphasia NIH 1. No focal deficits.   PSYCH: Cooperative, appropriate.

## 2022-10-30 NOTE — ED ADULT TRIAGE NOTE - CHIEF COMPLAINT QUOTE
Pt was ambulating when his right leg started to spasm. Pt was lowered to ground by grandson. Pt has his of right leg spasms. No LOC/HT/blood thinner use. As per pt this has happened 6 times to him.

## 2022-10-30 NOTE — CONSULT NOTE ADULT - SUBJECTIVE AND OBJECTIVE BOX
NEUROLOGY CONSULT    HPI:  67 M ith PMHx of DM, HLD, HTN, BPH presented as hypoglycemia of 50s, during the ED course change in speach noticed, stroke code was called, NIHS 1 due to mild aphasia, CTH negative, CTA and CTP pending official report. Pt had a head trauma during the game yesterday. He takes ASA at home. Pt also complains of RLE weakness which was due to R hip pain, and ROM full upon limiting the pain.     MEDICATIONS  Home Medications:  Ambien 5 mg oral tablet: 1 tab(s) orally once a day (at bedtime) (18 Aug 2021 09:18)  aspirin 81 mg oral tablet, chewable: 1 tab(s) orally once a day (18 Aug 2021 09:18)  glipiZIDE 10 mg oral tablet: 1 tab(s) orally once a day (18 Aug 2021 09:18)  levothyroxine 125 mcg (0.125 mg) oral tablet: 1 tab(s) orally once a day (18 Aug 2021 09:18)  metFORMIN 850 mg oral tablet: orally 2 times a day (18 Aug 2021 09:18)  Ozempic 2 mg/1.5 mL (0.25 mg or 0.5 mg dose) subcutaneous solution:  (18 Aug 2021 09:18)  simvastatin 10 mg oral tablet: 1 tab(s) orally once a day (at bedtime) (18 Aug 2021 09:18)    MEDICATIONS  (STANDING):    MEDICATIONS  (PRN):      FAMILY HISTORY:  Family history of lung cancer (Mother)    Family history of aneurysm (Father)  Father      SOCIAL HISTORY: negative for tobacco, alcohol, or ilicit drug use.    Allergies    penicillin (Angioedema)    Intolerances    GEN: NAD, pleasant, cooperative    NEURO:   MENTAL STATUS: AAOx3  LANG/SPEECH: Fluent, intact naming, slight impairment of repetition and wording.  CRANIAL NERVES:  II: Pupils equal and reactive, no RAPD, normal visual field and fundus  III, IV, VI: EOM intact, no gaze preference or deviation  V: normal  VII: no facial asymmetry  VIII: normal hearing to speech  MOTOR: 5/5 in both upper and lower extremities, except RLE limited ROM due to R hip pain, no drift after eliminating the pain from knee area.   REFLEXES: 2/4 throughout, bilateral flexor plantars  SENSORY: Normal to touch, temperature & pin prick in all extremities  COORD: Normal finger to nose and heel to shin, no tremor, no dysmetria  Gait: Stable, narrow base un assisted.   NIHSS: 1 aphasia.     LABS:                        12.1   11.73 )-----------( 300      ( 30 Oct 2022 19:19 )             36.0           Hemoglobin A1C:   Vitamin B12     CAPILLARY BLOOD GLUCOSE      POCT Blood Glucose.: 92 mg/dL (30 Oct 2022 19:03)              Microbiology:      RADIOLOGY, EKG AND ADDITIONAL TESTS: Reviewed.  < from: CT Brain Stroke Protocol (10.30.22 @ 18:41) >      IMPRESSION:  No CT evidence of large acute territorial infarct.  No evidence of acute intracranial pathology. No mass effect, midline   shift or intracranial hemorrhage.  Mild chronic microvascular-type changes.    These findings were discussed with NAZARIO Allen at 10/30/2022 6:46 PM   with read back confirmation.    --- End of Report ---    < end of copied text >

## 2022-10-30 NOTE — CONSULT NOTE ADULT - ATTENDING COMMENTS
Pt is a 68 yo M with PMHx of HTN, HLD, DM, chronic neck/back pain, who presented with transient dysarthria. Pt states that he had this multiple times in the past with hypoglycemic episodes. Now back to baseline, NIHSS 0. He also endorses occasional gait difficulty due to his legs giving out. This has occurred 5-6 times over the last year per patient. Follows up outpatient with pain management for cervical and lumbar pain, has had MRI's within the last year per report. No hyperreflexia or paresis appreciated on bedside exam.   Etiology of presenting symptoms likely 2/2 hypoglycemia. MRI brain negative for acute ischemia. Less likely TIA. F/u outpatient with ortho regarding possible progressive spinal stenosis. Please call with any further questions/concerns.

## 2022-10-30 NOTE — CONSULT NOTE ADULT - ASSESSMENT
67 M ith PMHx of DM, HLD, HTN, BPH presented as hypoglycemia of 50s, during the ED course change in speech noticed, stroke code was called, NIHS 1 due to mild aphasia, CTH negative, CTA and CTP pending official report. Pt had a head trauma during the game yesterday. He takes ASA at home. Pt also complains of RLE weakness which was due to R hip pain, and ROM full upon limiting the pain. On further evaluation he was able to ambulate without assist.     Recommendations:   MRI brain w/o  Start DAPT as ASA 81mg qd and Plavix 75mg qd. No loading needed.   Start Atorvastatin 80mg qd  Obtain MRI Brain without contrast  SBP goal 120 - 160 Goal  Check LDL, A1c, TSH.   Lovenox sq and SCD for DVT prophylaxis   PT/OT/PMNR consult  Toxicology screen.  Head trauma work up and medical management per primary team.     Thank you for the courtesy of this consult, Please contact us if any neurological changes or questions, neurology team will continue to follow.

## 2022-10-30 NOTE — STROKE CODE NOTE - NIH STROKE SCALE: 2. BEST GAZE, QM
(0) Normal I will STOP taking the medications listed below when I get home from the hospital:    diltiaZEM 240 mg/24 hours oral capsule, extended release  -- 1 cap(s) by mouth once a day

## 2022-10-30 NOTE — ED PROVIDER NOTE - OBJECTIVE STATEMENT
67-year-old male with past medical history of diabetes, hypertension, hyperlipidemia, CVA presenting for evaluation of bilateral lower extremity weakness and dysarthria about 2 hours ago. 67-year-old male with past medical history of diabetes, hypertension, hyperlipidemia, CVA presenting for evaluation of bilateral lower extremity weakness and dysarthria about 2 hours ago. Patient has never happened to him before his right lower leg gave out and was found to be hypoglycemic with a blood glucose level in the 50s.  Patient reports having difficulty speaking and describes it as having sandpaper in his mouth which he states has never happened to him before.  Patient also endorses having a head injury yesterday while at a baseball game.  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  No LOC.  No blood thinners. No cp, sob, fever, chills, abdominal pain, nausea, vomiting, diarrhea, back pain, urinary symptoms, headache, dizziness, paresthesias, or weakness.

## 2022-10-30 NOTE — ED ADULT NURSE NOTE - OBJECTIVE STATEMENT
Patient a+ox4 co right leg spasms today while walking, endorses history of leg spasms but worse today. Pt denies fall or injury

## 2022-10-30 NOTE — ED ADULT NURSE NOTE - INTERVENTIONS DEFINITIONS
[place] to call system/Call bell, personal items and telephone within reach/Instruct patient to call for assistance/Room bathroom lighting operational/Non-slip footwear when patient is off stretcher/Physically safe environment: no spills, clutter or unnecessary equipment/Stretcher in lowest position, wheels locked, appropriate side rails in place/Monitor gait and stability/Monitor for mental status changes and reorient to person, place, and time/Review medications for side effects contributing to fall risk/Reinforce activity limits and safety measures with patient and family/Provide visual clues: red socks

## 2022-10-30 NOTE — STROKE CODE NOTE - IV ALTEPLASE EXCL ABS HIDDEN
Patient Education       External Ear Infection (Child)  Your child has an infection in the ear canal. This problem is also known as external otitis, otitis externa, or “swimmer’s ear.” It is usually caused by bacteria or fungus. It can occur if water is trapped in the ear canal (from swimming or bathing). Putting cotton swabs or other objects in the ear can also damage the skin in the ear canal and make this problem more likely.  Your child may have pain, itching, redness, drainage, or swelling of the ear canal. He or she may also have temporary hearing loss. In most cases, symptoms resolve within a week.  Home care  Follow these guidelines when caring for your child at home:  · Don’t try to clean the ear canal. This may push pus and bacteria deeper into the canal.  · Use prescribed eardrops as directed. These help reduce swelling and fight the infection. If an ear wick was placed in the ear canal, apply drops right onto the end of the wick. The wick will draw the medicine into the ear canal even if it is swollen closed.  · A cotton ball may be loosely placed in the outer ear to absorb any drainage.  · Don’t allow water to get into your child’s ear when he or she bathing. Also, don’t allow your child to go swimming for at least 7 to10 days after starting treatment.  · You may give your child acetaminophen to control pain, unless another pain medicine was prescribed. In children older than 6 months, you may use ibuprofen instead of acetaminophen. If your child has chronic liver or kidney disease, talk with the provider before using these medicines. Also talk with the provider if your child has had a stomach ulcer or gastrointestinal bleeding. Don’t give aspirin to a child younger than 18 years old who is ill with a fever. It may cause severe liver damage.  Prevention  · Don’t clean the inside of your child’s ears. Also, caution your child not to stick objects inside his or her ears.  · Have your child wear earplugs  when swimming.  · After exiting water, have your child turn his or her head to the side to drain any excess water from the ears. Ears should be dried well with a towel. A hair dryer may be used to dry the ears, but it needs to be on a low or cool setting and about 12 inches away from the ears.  · If your child feels water trapped in the ears, use ear drops right away. You can get these drops over the counter at most drugstores. They work by removing water from the ear canal.  Follow-up care  Follow up with your child’s healthcare provider, or as directed.  When to seek medical advice  Call your child's provider right away if any of these occur:  · Fever (see Fever and children, below)  · Symptoms worsen or do not get better after 3 days of treatment  · New symptoms appear  · Outer ear becomes red, warm, or swollen     Fever and children  Always use a digital thermometer to check your child’s temperature. Never use a mercury thermometer.  For infants and toddlers, be sure to use a rectal thermometer correctly. A rectal thermometer may accidentally poke a hole in (perforate) the rectum. It may also pass on germs from the stool. Always follow the product maker’s directions for proper use. If you don’t feel comfortable taking a rectal temperature, use another method. When you talk to your child’s healthcare provider, tell him or her which method you used to take your child’s temperature.  Here are guidelines for fever temperature. Ear temperatures aren’t accurate before 6 months of age. Don’t take an oral temperature until your child is at least 4 years old.  Infant under 3 months old:  · Ask your child’s healthcare provider how you should take the temperature.  · Rectal or forehead (temporal artery) temperature of 100.4°F (38°C) or higher, or as directed by the provider  · Armpit temperature of 99°F (37.2°C) or higher, or as directed by the provider  Child age 3 to 36 months:  · Rectal, forehead (temporal artery), or  ear temperature of 102°F (38.9°C) or higher, or as directed by the provider  · Armpit temperature of 101°F (38.3°C) or higher, or as directed by the provider  Child of any age:  · Repeated temperature of 104°F (40°C) or higher, or as directed by the provider  · Fever that lasts more than 24 hours in a child under 2 years old. Or a fever that lasts for 3 days in a child 2 years or older.      Date Last Reviewed: 6/2/2017 © 2000-2018 [employer]. [address] [place] [address]. All rights reserved. This information is not intended as a substitute for professional medical care. Always follow your healthcare professional's instructions.            show

## 2022-10-31 LAB
A1C WITH ESTIMATED AVERAGE GLUCOSE RESULT: 9.1 % — HIGH (ref 4–5.6)
ALBUMIN SERPL ELPH-MCNC: 3.5 G/DL — SIGNIFICANT CHANGE UP (ref 3.5–5.2)
ALP SERPL-CCNC: 83 U/L — SIGNIFICANT CHANGE UP (ref 30–115)
ALT FLD-CCNC: 13 U/L — SIGNIFICANT CHANGE UP (ref 0–41)
ANION GAP SERPL CALC-SCNC: 10 MMOL/L — SIGNIFICANT CHANGE UP (ref 7–14)
AST SERPL-CCNC: 15 U/L — SIGNIFICANT CHANGE UP (ref 0–41)
BASOPHILS # BLD AUTO: 0.05 K/UL — SIGNIFICANT CHANGE UP (ref 0–0.2)
BASOPHILS NFR BLD AUTO: 0.5 % — SIGNIFICANT CHANGE UP (ref 0–1)
BILIRUB SERPL-MCNC: 0.5 MG/DL — SIGNIFICANT CHANGE UP (ref 0.2–1.2)
BUN SERPL-MCNC: 12 MG/DL — SIGNIFICANT CHANGE UP (ref 10–20)
CALCIUM SERPL-MCNC: 9.1 MG/DL — SIGNIFICANT CHANGE UP (ref 8.4–10.4)
CHLORIDE SERPL-SCNC: 105 MMOL/L — SIGNIFICANT CHANGE UP (ref 98–110)
CHOLEST SERPL-MCNC: 111 MG/DL — SIGNIFICANT CHANGE UP
CO2 SERPL-SCNC: 26 MMOL/L — SIGNIFICANT CHANGE UP (ref 17–32)
CREAT SERPL-MCNC: 0.9 MG/DL — SIGNIFICANT CHANGE UP (ref 0.7–1.5)
CRP SERPL-MCNC: 4.6 MG/L — HIGH
D DIMER BLD IA.RAPID-MCNC: 177 NG/ML DDU — SIGNIFICANT CHANGE UP (ref 0–230)
EGFR: 93 ML/MIN/1.73M2 — SIGNIFICANT CHANGE UP
EOSINOPHIL # BLD AUTO: 0.12 K/UL — SIGNIFICANT CHANGE UP (ref 0–0.7)
EOSINOPHIL NFR BLD AUTO: 1.1 % — SIGNIFICANT CHANGE UP (ref 0–8)
ERYTHROCYTE [SEDIMENTATION RATE] IN BLOOD: 44 MM/HR — HIGH (ref 0–10)
ESTIMATED AVERAGE GLUCOSE: 214 MG/DL — HIGH (ref 68–114)
FERRITIN SERPL-MCNC: 162 NG/ML — SIGNIFICANT CHANGE UP (ref 30–400)
GLUCOSE BLDC GLUCOMTR-MCNC: 111 MG/DL — HIGH (ref 70–99)
GLUCOSE BLDC GLUCOMTR-MCNC: 119 MG/DL — HIGH (ref 70–99)
GLUCOSE BLDC GLUCOMTR-MCNC: 154 MG/DL — HIGH (ref 70–99)
GLUCOSE BLDC GLUCOMTR-MCNC: 168 MG/DL — HIGH (ref 70–99)
GLUCOSE BLDC GLUCOMTR-MCNC: 182 MG/DL — HIGH (ref 70–99)
GLUCOSE BLDC GLUCOMTR-MCNC: 84 MG/DL — SIGNIFICANT CHANGE UP (ref 70–99)
GLUCOSE SERPL-MCNC: 105 MG/DL — HIGH (ref 70–99)
HCT VFR BLD CALC: 36.3 % — LOW (ref 42–52)
HCV AB S/CO SERPL IA: 0.04 COI — SIGNIFICANT CHANGE UP
HCV AB SERPL-IMP: SIGNIFICANT CHANGE UP
HDLC SERPL-MCNC: 37 MG/DL — LOW
HGB BLD-MCNC: 12.2 G/DL — LOW (ref 14–18)
IMM GRANULOCYTES NFR BLD AUTO: 0.6 % — HIGH (ref 0.1–0.3)
LDH SERPL L TO P-CCNC: 180 U/L — SIGNIFICANT CHANGE UP (ref 50–242)
LIPID PNL WITH DIRECT LDL SERPL: 59 MG/DL — SIGNIFICANT CHANGE UP
LYMPHOCYTES # BLD AUTO: 28.5 % — SIGNIFICANT CHANGE UP (ref 20.5–51.1)
LYMPHOCYTES # BLD AUTO: 3.05 K/UL — SIGNIFICANT CHANGE UP (ref 1.2–3.4)
MAGNESIUM SERPL-MCNC: 1.4 MG/DL — LOW (ref 1.8–2.4)
MCHC RBC-ENTMCNC: 29.3 PG — SIGNIFICANT CHANGE UP (ref 27–31)
MCHC RBC-ENTMCNC: 33.6 G/DL — SIGNIFICANT CHANGE UP (ref 32–37)
MCV RBC AUTO: 87.3 FL — SIGNIFICANT CHANGE UP (ref 80–94)
MONOCYTES # BLD AUTO: 0.83 K/UL — HIGH (ref 0.1–0.6)
MONOCYTES NFR BLD AUTO: 7.8 % — SIGNIFICANT CHANGE UP (ref 1.7–9.3)
NEUTROPHILS # BLD AUTO: 6.58 K/UL — HIGH (ref 1.4–6.5)
NEUTROPHILS NFR BLD AUTO: 61.5 % — SIGNIFICANT CHANGE UP (ref 42.2–75.2)
NON HDL CHOLESTEROL: 74 MG/DL — SIGNIFICANT CHANGE UP
NRBC # BLD: 0 /100 WBCS — SIGNIFICANT CHANGE UP (ref 0–0)
PLATELET # BLD AUTO: 303 K/UL — SIGNIFICANT CHANGE UP (ref 130–400)
POTASSIUM SERPL-MCNC: 4.2 MMOL/L — SIGNIFICANT CHANGE UP (ref 3.5–5)
POTASSIUM SERPL-SCNC: 4.2 MMOL/L — SIGNIFICANT CHANGE UP (ref 3.5–5)
PROT SERPL-MCNC: 6.3 G/DL — SIGNIFICANT CHANGE UP (ref 6–8)
RBC # BLD: 4.16 M/UL — LOW (ref 4.7–6.1)
RBC # FLD: 13.2 % — SIGNIFICANT CHANGE UP (ref 11.5–14.5)
SODIUM SERPL-SCNC: 141 MMOL/L — SIGNIFICANT CHANGE UP (ref 135–146)
TRIGL SERPL-MCNC: 76 MG/DL — SIGNIFICANT CHANGE UP
TSH SERPL-MCNC: 0.22 UIU/ML — LOW (ref 0.27–4.2)
WBC # BLD: 10.69 K/UL — SIGNIFICANT CHANGE UP (ref 4.8–10.8)
WBC # FLD AUTO: 10.69 K/UL — SIGNIFICANT CHANGE UP (ref 4.8–10.8)

## 2022-10-31 PROCEDURE — 99223 1ST HOSP IP/OBS HIGH 75: CPT

## 2022-10-31 PROCEDURE — 70551 MRI BRAIN STEM W/O DYE: CPT | Mod: 26

## 2022-10-31 PROCEDURE — 99222 1ST HOSP IP/OBS MODERATE 55: CPT

## 2022-10-31 RX ORDER — DEXTROSE 50 % IN WATER 50 %
25 SYRINGE (ML) INTRAVENOUS ONCE
Refills: 0 | Status: DISCONTINUED | OUTPATIENT
Start: 2022-10-31 | End: 2022-10-31

## 2022-10-31 RX ORDER — SODIUM CHLORIDE 9 MG/ML
1000 INJECTION, SOLUTION INTRAVENOUS
Refills: 0 | Status: DISCONTINUED | OUTPATIENT
Start: 2022-10-31 | End: 2022-10-31

## 2022-10-31 RX ORDER — INSULIN LISPRO 100/ML
VIAL (ML) SUBCUTANEOUS
Refills: 0 | Status: DISCONTINUED | OUTPATIENT
Start: 2022-10-31 | End: 2022-10-31

## 2022-10-31 RX ORDER — ATORVASTATIN CALCIUM 80 MG/1
80 TABLET, FILM COATED ORAL AT BEDTIME
Refills: 0 | Status: DISCONTINUED | OUTPATIENT
Start: 2022-10-31 | End: 2022-11-01

## 2022-10-31 RX ORDER — ASPIRIN/CALCIUM CARB/MAGNESIUM 324 MG
81 TABLET ORAL DAILY
Refills: 0 | Status: DISCONTINUED | OUTPATIENT
Start: 2022-10-31 | End: 2022-11-01

## 2022-10-31 RX ORDER — AMITRIPTYLINE HCL 25 MG
50 TABLET ORAL AT BEDTIME
Refills: 0 | Status: DISCONTINUED | OUTPATIENT
Start: 2022-10-31 | End: 2022-11-01

## 2022-10-31 RX ORDER — MAGNESIUM SULFATE 500 MG/ML
2 VIAL (ML) INJECTION
Refills: 0 | Status: COMPLETED | OUTPATIENT
Start: 2022-10-31 | End: 2022-10-31

## 2022-10-31 RX ORDER — GLUCAGON INJECTION, SOLUTION 0.5 MG/.1ML
1 INJECTION, SOLUTION SUBCUTANEOUS ONCE
Refills: 0 | Status: DISCONTINUED | OUTPATIENT
Start: 2022-10-31 | End: 2022-11-01

## 2022-10-31 RX ORDER — ENOXAPARIN SODIUM 100 MG/ML
40 INJECTION SUBCUTANEOUS EVERY 24 HOURS
Refills: 0 | Status: DISCONTINUED | OUTPATIENT
Start: 2022-10-31 | End: 2022-11-01

## 2022-10-31 RX ORDER — PANTOPRAZOLE SODIUM 20 MG/1
40 TABLET, DELAYED RELEASE ORAL
Refills: 0 | Status: DISCONTINUED | OUTPATIENT
Start: 2022-10-31 | End: 2022-11-01

## 2022-10-31 RX ORDER — INSULIN GLARGINE 100 [IU]/ML
14 INJECTION, SOLUTION SUBCUTANEOUS
Qty: 0 | Refills: 0 | DISCHARGE

## 2022-10-31 RX ORDER — INSULIN ASPART 100 [IU]/ML
10 INJECTION, SOLUTION SUBCUTANEOUS
Qty: 0 | Refills: 0 | DISCHARGE

## 2022-10-31 RX ORDER — LEVOTHYROXINE SODIUM 125 MCG
175 TABLET ORAL DAILY
Refills: 0 | Status: DISCONTINUED | OUTPATIENT
Start: 2022-10-31 | End: 2022-11-01

## 2022-10-31 RX ORDER — DEXTROSE 50 % IN WATER 50 %
12.5 SYRINGE (ML) INTRAVENOUS ONCE
Refills: 0 | Status: DISCONTINUED | OUTPATIENT
Start: 2022-10-31 | End: 2022-10-31

## 2022-10-31 RX ORDER — DEXTROSE 50 % IN WATER 50 %
15 SYRINGE (ML) INTRAVENOUS ONCE
Refills: 0 | Status: DISCONTINUED | OUTPATIENT
Start: 2022-10-31 | End: 2022-10-31

## 2022-10-31 RX ORDER — CLOPIDOGREL BISULFATE 75 MG/1
75 TABLET, FILM COATED ORAL DAILY
Refills: 0 | Status: DISCONTINUED | OUTPATIENT
Start: 2022-10-31 | End: 2022-11-01

## 2022-10-31 RX ORDER — INSULIN GLARGINE 100 [IU]/ML
14 INJECTION, SOLUTION SUBCUTANEOUS EVERY MORNING
Refills: 0 | Status: DISCONTINUED | OUTPATIENT
Start: 2022-10-31 | End: 2022-10-31

## 2022-10-31 RX ADMIN — CLOPIDOGREL BISULFATE 75 MILLIGRAM(S): 75 TABLET, FILM COATED ORAL at 12:22

## 2022-10-31 RX ADMIN — Medication 50 MILLIGRAM(S): at 21:27

## 2022-10-31 RX ADMIN — Medication 25 GRAM(S): at 18:33

## 2022-10-31 RX ADMIN — Medication 175 MICROGRAM(S): at 06:00

## 2022-10-31 RX ADMIN — ENOXAPARIN SODIUM 40 MILLIGRAM(S): 100 INJECTION SUBCUTANEOUS at 06:00

## 2022-10-31 RX ADMIN — Medication 81 MILLIGRAM(S): at 12:22

## 2022-10-31 RX ADMIN — ATORVASTATIN CALCIUM 80 MILLIGRAM(S): 80 TABLET, FILM COATED ORAL at 21:27

## 2022-10-31 RX ADMIN — Medication 25 GRAM(S): at 21:27

## 2022-10-31 NOTE — CHART NOTE - NSCHARTNOTEFT_GEN_A_CORE
INTERVAL HPI/OVERNIGHT EVENTS:    SUBJECTIVE: Patient seen and examined at bedside.     no cp, sob, abd pain, fever  no ha, dizziness, lightheadedness, syncope  OBJECTIVE:    VITAL SIGNS:  Vital Signs Last 24 Hrs  T(C): 36.6 (31 Oct 2022 00:26), Max: 36.7 (30 Oct 2022 16:42)  T(F): 97.9 (31 Oct 2022 00:26), Max: 98 (30 Oct 2022 16:42)  HR: 97 (31 Oct 2022 06:01) (88 - 99)  BP: 133/75 (31 Oct 2022 06:01) (120/64 - 139/77)  BP(mean): --  RR: 18 (31 Oct 2022 06:01) (16 - 18)  SpO2: 97% (31 Oct 2022 06:01) (97% - 99%)    Parameters below as of 31 Oct 2022 06:01  Patient On (Oxygen Delivery Method): room air          PHYSICAL EXAM:    General: NAD  HEENT: NC/AT; PERRL, clear conjunctiva  Neck: supple  Respiratory: CTA b/l  Cardiovascular: +S1/S2; RRR  Abdomen: soft, NT/ND; +BS x4  Extremities: WWP, 2+ peripheral pulses b/l; no LE edema  Skin: normal color and turgor; no rash  Neurological:    MEDICATIONS:  MEDICATIONS  (STANDING):  amitriptyline 50 milliGRAM(s) Oral at bedtime  aspirin  chewable 81 milliGRAM(s) Oral daily  atorvastatin 80 milliGRAM(s) Oral at bedtime  clopidogrel Tablet 75 milliGRAM(s) Oral daily  enoxaparin Injectable 40 milliGRAM(s) SubCutaneous every 24 hours  glucagon  Injectable 1 milliGRAM(s) IntraMuscular once  levothyroxine 175 MICROGram(s) Oral daily  magnesium sulfate  IVPB 2 Gram(s) IV Intermittent every 2 hours  pantoprazole    Tablet 40 milliGRAM(s) Oral before breakfast    MEDICATIONS  (PRN):      ALLERGIES:  Allergies    penicillin (Angioedema)    Intolerances        LABS:                        12.2   10.69 )-----------( 303      ( 31 Oct 2022 06:34 )             36.3     Hemoglobin: 12.2 g/dL (10-31 @ 06:34)  Hemoglobin: 12.1 g/dL (10-30 @ 19:19)    CBC Full  -  ( 31 Oct 2022 06:34 )  WBC Count : 10.69 K/uL  RBC Count : 4.16 M/uL  Hemoglobin : 12.2 g/dL  Hematocrit : 36.3 %  Platelet Count - Automated : 303 K/uL  Mean Cell Volume : 87.3 fL  Mean Cell Hemoglobin : 29.3 pg  Mean Cell Hemoglobin Concentration : 33.6 g/dL  Auto Neutrophil # : 6.58 K/uL  Auto Lymphocyte # : 3.05 K/uL  Auto Monocyte # : 0.83 K/uL  Auto Eosinophil # : 0.12 K/uL  Auto Basophil # : 0.05 K/uL  Auto Neutrophil % : 61.5 %  Auto Lymphocyte % : 28.5 %  Auto Monocyte % : 7.8 %  Auto Eosinophil % : 1.1 %  Auto Basophil % : 0.5 %    10-31    141  |  105  |  12  ----------------------------<  105<H>  4.2   |  26  |  0.9    Ca    9.1      31 Oct 2022 06:34  Mg     1.4     10-31    TPro  6.3  /  Alb  3.5  /  TBili  0.5  /  DBili  x   /  AST  15  /  ALT  13  /  AlkPhos  83  10-31    Creatinine Trend: 0.9<--, 1.0<--  LIVER FUNCTIONS - ( 31 Oct 2022 06:34 )  Alb: 3.5 g/dL / Pro: 6.3 g/dL / ALK PHOS: 83 U/L / ALT: 13 U/L / AST: 15 U/L / GGT: x           PT/INR - ( 30 Oct 2022 19:19 )   PT: 11.90 sec;   INR: 1.04 ratio         PTT - ( 30 Oct 2022 19:19 )  PTT:31.0 sec    hs Troponin:        18:31 - VBG - pH: 7.36  | pCO2: 50    | pO2: 30    | Lactate: 0.90         CSF:                      EKG:   MICROBIOLOGY:    IMAGING:      Labs, imaging, EKG personally reviewed    RADIOLOGY & ADDITIONAL TESTS: Reviewed.    a/p  #RLE weakness, inability to ambulate  chronic for past year, LE gives out resulting in 6 falls over past year  cth neg  mri neg  PT  f/u neuro    #Progress Note Handoff:  Pending (specify):  Consults_________, Tests________, Test Results_______, Other___pt______  Family discussion: d/w pt at bedside re: treatment plan, primary dx  Disposition: Home___/SNF___/Other________/Unknown at this time___x_____ INTERVAL HPI/OVERNIGHT EVENTS:    SUBJECTIVE: Patient seen and examined at bedside.     no cp, sob, abd pain, fever  no ha, dizziness, lightheadedness, syncope  OBJECTIVE:    VITAL SIGNS:  Vital Signs Last 24 Hrs  T(C): 36.6 (31 Oct 2022 00:26), Max: 36.7 (30 Oct 2022 16:42)  T(F): 97.9 (31 Oct 2022 00:26), Max: 98 (30 Oct 2022 16:42)  HR: 97 (31 Oct 2022 06:01) (88 - 99)  BP: 133/75 (31 Oct 2022 06:01) (120/64 - 139/77)  BP(mean): --  RR: 18 (31 Oct 2022 06:01) (16 - 18)  SpO2: 97% (31 Oct 2022 06:01) (97% - 99%)    Parameters below as of 31 Oct 2022 06:01  Patient On (Oxygen Delivery Method): room air          PHYSICAL EXAM:    General: NAD  HEENT: NC/AT; PERRL, clear conjunctiva  Neck: supple  Respiratory: CTA b/l  Cardiovascular: +S1/S2; RRR  Abdomen: soft, NT/ND; +BS x4  Extremities: WWP, 2+ peripheral pulses b/l; no LE edema  Skin: normal color and turgor; no rash  Neurological:    MEDICATIONS:  MEDICATIONS  (STANDING):  amitriptyline 50 milliGRAM(s) Oral at bedtime  aspirin  chewable 81 milliGRAM(s) Oral daily  atorvastatin 80 milliGRAM(s) Oral at bedtime  clopidogrel Tablet 75 milliGRAM(s) Oral daily  enoxaparin Injectable 40 milliGRAM(s) SubCutaneous every 24 hours  glucagon  Injectable 1 milliGRAM(s) IntraMuscular once  levothyroxine 175 MICROGram(s) Oral daily  magnesium sulfate  IVPB 2 Gram(s) IV Intermittent every 2 hours  pantoprazole    Tablet 40 milliGRAM(s) Oral before breakfast    MEDICATIONS  (PRN):      ALLERGIES:  Allergies    penicillin (Angioedema)    Intolerances        LABS:                        12.2   10.69 )-----------( 303      ( 31 Oct 2022 06:34 )             36.3     Hemoglobin: 12.2 g/dL (10-31 @ 06:34)  Hemoglobin: 12.1 g/dL (10-30 @ 19:19)    CBC Full  -  ( 31 Oct 2022 06:34 )  WBC Count : 10.69 K/uL  RBC Count : 4.16 M/uL  Hemoglobin : 12.2 g/dL  Hematocrit : 36.3 %  Platelet Count - Automated : 303 K/uL  Mean Cell Volume : 87.3 fL  Mean Cell Hemoglobin : 29.3 pg  Mean Cell Hemoglobin Concentration : 33.6 g/dL  Auto Neutrophil # : 6.58 K/uL  Auto Lymphocyte # : 3.05 K/uL  Auto Monocyte # : 0.83 K/uL  Auto Eosinophil # : 0.12 K/uL  Auto Basophil # : 0.05 K/uL  Auto Neutrophil % : 61.5 %  Auto Lymphocyte % : 28.5 %  Auto Monocyte % : 7.8 %  Auto Eosinophil % : 1.1 %  Auto Basophil % : 0.5 %    10-31    141  |  105  |  12  ----------------------------<  105<H>  4.2   |  26  |  0.9    Ca    9.1      31 Oct 2022 06:34  Mg     1.4     10-31    TPro  6.3  /  Alb  3.5  /  TBili  0.5  /  DBili  x   /  AST  15  /  ALT  13  /  AlkPhos  83  10-31    Creatinine Trend: 0.9<--, 1.0<--  LIVER FUNCTIONS - ( 31 Oct 2022 06:34 )  Alb: 3.5 g/dL / Pro: 6.3 g/dL / ALK PHOS: 83 U/L / ALT: 13 U/L / AST: 15 U/L / GGT: x           PT/INR - ( 30 Oct 2022 19:19 )   PT: 11.90 sec;   INR: 1.04 ratio         PTT - ( 30 Oct 2022 19:19 )  PTT:31.0 sec    hs Troponin:        18:31 - VBG - pH: 7.36  | pCO2: 50    | pO2: 30    | Lactate: 0.90         CSF:                      EKG:   MICROBIOLOGY:    IMAGING:      Labs, imaging, EKG personally reviewed    RADIOLOGY & ADDITIONAL TESTS: Reviewed.    a/p  #RLE weakness, inability to ambulate  chronic for past year, LE gives out resulting in 6 falls over past year  +trauma to head by baseball  cth neg  mri neg  PT  f/u neuro    #Progress Note Handoff:  Pending (specify):  Consults_________, Tests________, Test Results_______, Other___pt______  Family discussion: d/w pt at bedside re: treatment plan, primary dx  Disposition: Home___/SNF___/Other________/Unknown at this time___x_____

## 2022-10-31 NOTE — PHYSICAL THERAPY INITIAL EVALUATION ADULT - SPECIFY REASON(S)
The patient is a 13y year old Female complaining of medical evaluation. Chart reviewed. Pt. currently on bedrest. PT evaluation on hold pending activity orders as appropriate. Will follow.

## 2022-10-31 NOTE — H&P ADULT - NSHPPHYSICALEXAM_GEN_ALL_CORE
Gen: NAD, non-toxic, conversational  Eyes: PERRLA, EOMI   HENT: Normocephalic, atraumatic. External ears normal, no rhinorrhea, moist mucous membranes.   CV: RRR, no murmur  Resp: Clear, non-labored, speaking without difficulty on room air  Abd: soft, non tender, non rigid, no guarding or rebound tenderness  Back: No CVAT bilaterally, no midline ttp  Skin: dry  Ext: No edema, bilateral great toe of LE onychomycosis   Neuro: AOx3, speech is fluent and appropriate, right leg weakness/limited due to pain, decreased sensation in bilateral feet   Psych: Mood normal, affect euthymic

## 2022-10-31 NOTE — H&P ADULT - HISTORY OF PRESENT ILLNESS
67 M [facility] PMHx of DM, HLD, BPH presented to the hospital with complaints of right lower leg weakness. 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 He had pain on the head but no other complaints at that time. No LOC, ENT bleed, dizziness.     Patient noticed that his right leg was weak yesterday when he went for shopping. When asked further he mentioned that right leg was shaky and gave up at one point so he had to hold to the window. Patient says this is the 6th time it happened in last two years. No shaking of arms, asymmetry of face,        presented as hypoglycemia of 50s, during the ED course change in speech noticed, stroke code was called, NIHS 1 due to mild aphasia, CTH negative, CTA and CTP pending official report. Pt had a head trauma during the game yesterday. He takes ASA at home. Pt also complains of RLE weakness which was due to R hip pain, and ROM full upon limiting the pain. On further evaluation he was able to ambulate without assist.    67 M [facility] PMHx of DM, HLD, BPH presented to the hospital with complaints of right lower leg weakness. 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 He had pain on the head but no other complaints at that time. No LOC, ENT bleed, dizziness.     Patient noticed that his right leg was weak yesterday when he went for shopping. When asked further he mentioned that right leg was shaky and gave up at one point so he had to hold to the window. Patient says this is the 6th time it happened in last two years. He also noted that his speech was slow/stuttering for the last 2 months. No shaking of arms, asymmetry of face, LOC, blurred vision.    Off note patient was discharged for [facility] 3 weeks ago after being admitted for COVID for 4 days not requiring mechanical ventilation.       In the ED  Presented as hypoglycemia of 50s, during the ED course change in speech noticed, stroke code was called, NIHS 1 due to mild aphasia.  Vitals stable, saturating well on RA  Labs show WBC 11.7, Hb 12  COVID positive  CTH negative, CTA and CTP shows no perfusion abnormalities    Patient was seen by neurology no TPA, planned for MRI tomorrow and admitted for suspected stroke and covid 19

## 2022-10-31 NOTE — H&P ADULT - ATTENDING COMMENTS
Patient seen and examined at bedside independently of the residents. I read the resident's note and agree with the plan with the additions and corrections as noted below.    REVIEW OF SYSTEMS:  CONSTITUTIONAL: No weakness, fevers or chills  EYES/ENT: No visual changes;  No vertigo or throat pain.   NECK: No pain or stiffness  RESPIRATORY: No cough, wheezing, hemoptysis; No shortness of breath  CARDIOVASCULAR: No chest pain or palpitations  GASTROINTESTINAL: No abdominal or epigastric pain. No nausea, vomiting, or hematemesis; No diarrhea or constipation. No melena or hematochezia.  GENITOURINARY: No dysuria, frequency or hematuria  NEUROLOGICAL: No numbness or weakness. Difficulty with speech.   MSK: No pain. RLE weakness.   SKIN: No itching, rashes.     PMH:  HTN, HLD, DM II, BPH    FHx: Reviewed. Not relevant.     Physical Exam:  GEN: No acute distress. A & O x 3.   Head: Atraumatic, Normocephalic.   Eye: PEERLA. No sclera icterus. EOMI.   ENT: Normal oropharynx, no thyromegaly.   LUNGS: Clear to auscultation bilaterally. No wheeze/rales/crackles.   HEART: Normal. S1/S2 present. RRR. No murmur/gallops.   ABD: Soft, non-tender, non-distended. Bowel sounds present.   EXT: No pitting edema.   Integumentary: No rash, No petechia.   NEURO: CN III-XII intact. Strength: 5/5 b/l ULE. Sensory intact b/l ULE.     Vital Signs Last 24 Hrs  T(C): 36.6 (31 Oct 2022 00:26), Max: 36.7 (30 Oct 2022 16:42)  T(F): 97.9 (31 Oct 2022 00:26), Max: 98 (30 Oct 2022 16:42)  HR: 93 (31 Oct 2022 00:26) (88 - 99)  BP: 139/77 (31 Oct 2022 00:26) (120/64 - 139/77)  BP(mean): --  RR: 18 (31 Oct 2022 00:26) (16 - 18)  SpO2: 99% (31 Oct 2022 00:26) (98% - 99%)    Parameters below as of 31 Oct 2022 00:26  Patient On (Oxygen Delivery Method): room air      Please see the above notes for Labs and radiology.     Assessment and Plan:     68 yo M with hx of HTN, HLD, DM II, CVA?? and BPH presents to ED for RLL weakness and dysarthria. Patient was found to be hypoglycemic in ED.     RLE weakness and dysarthria  - r/o acute CVA  - s/p stroke code called. No tPA given.   - CTH negative for acute intracranial pathology.   - CTP/CTA H & N unremarkable.   - check MRI brain NC and TTE with bubble study  - c/w ASA, Plavix and statin   - check HbA1c and Lipid panel.   - check Urine toxicology.   - monitor FS and BP closely.   - neuro check   - Monitor on Telemetry.     Hypoglycemia (resolved)  - monitor FS closely.     DM II - monitor FS closely. Hold off PO anti-diabetic med for now. May start on insulin if FS persistently > 180.     COVID-19 infection  - No hypoxia. No SOB. CXR unremarkable.   - supportive care.   - check inflammatory markers.     HTN/HLD - c/w home med      DVT ppx: Lovenox SC   GI ppx: PPI  Diet: DASH   Activity: as tolerated.     Date seen by the attending: 10/30/2022. Patient seen and examined at bedside independently of the residents. I read the resident's note and agree with the plan with the additions and corrections as noted below.    REVIEW OF SYSTEMS:  CONSTITUTIONAL: No weakness, fevers or chills  EYES/ENT: No visual changes;  No vertigo or throat pain.   NECK: No pain or stiffness  RESPIRATORY: No cough, wheezing, hemoptysis; No shortness of breath  CARDIOVASCULAR: No chest pain or palpitations  GASTROINTESTINAL: No abdominal or epigastric pain. No nausea, vomiting, or hematemesis; No diarrhea or constipation. No melena or hematochezia.  GENITOURINARY: No dysuria, frequency or hematuria  NEUROLOGICAL: No numbness or weakness. Difficulty with speech.   MSK: No pain. RLE weakness.   SKIN: No itching, rashes.     PMH:  HTN, HLD, DM II, BPH    FHx: Reviewed. Not relevant.     Physical Exam:  GEN: No acute distress. A & O x 3.   Head: Atraumatic, Normocephalic.   Eye: PEERLA. No sclera icterus. EOMI.   ENT: Normal oropharynx, no thyromegaly.   LUNGS: Clear to auscultation bilaterally. No wheeze/rales/crackles.   HEART: Normal. S1/S2 present. RRR. No murmur/gallops.   ABD: Soft, non-tender, non-distended. Bowel sounds present.   EXT: No pitting edema.   Integumentary: No rash, No petechia.   NEURO: CN III-XII intact. Strength: 5/5 b/l ULE. Sensory intact b/l ULE.     Vital Signs Last 24 Hrs  T(C): 36.6 (31 Oct 2022 00:26), Max: 36.7 (30 Oct 2022 16:42)  T(F): 97.9 (31 Oct 2022 00:26), Max: 98 (30 Oct 2022 16:42)  HR: 93 (31 Oct 2022 00:26) (88 - 99)  BP: 139/77 (31 Oct 2022 00:26) (120/64 - 139/77)  BP(mean): --  RR: 18 (31 Oct 2022 00:26) (16 - 18)  SpO2: 99% (31 Oct 2022 00:26) (98% - 99%)    Parameters below as of 31 Oct 2022 00:26  Patient On (Oxygen Delivery Method): room air      Please see the above notes for Labs and radiology.     Assessment and Plan:     68 yo M with hx of HTN, HLD, DM II, CVA?? and BPH presents to ED for RLL weakness and dysarthria. Patient was found to be hypoglycemic in ED.     RLE weakness and dysarthria  - r/o acute CVA  - s/p stroke code called. No tPA given.   - CTH negative for acute intracranial pathology.   - CTP/CTA H & N unremarkable.   - check MRI brain NC and TTE with bubble study  - c/w ASA, Plavix and statin   - check HbA1c and Lipid panel.   - check Urine toxicology.   - monitor FS and BP closely.   - neuro check   - Monitor on Telemetry.     Hypoglycemia (resolved)  - monitor FS closely.     DM II - monitor FS closely. Hold off PO anti-diabetic med for now. May start on insulin if FS persistently > 180.     COVID-19 infection  - No hypoxia. No SOB. CXR unremarkable.   - supportive care.   - check inflammatory markers.     HTN/HLD - c/w home med      DVT ppx: Lovenox SC   GI ppx: PPI  Diet: DASH   Activity: as tolerated.     Date seen by the attending: 10/31/2022.

## 2022-10-31 NOTE — H&P ADULT - ASSESSMENT
67 M [facility] PMHx of DM, HLD, BPH presented as hypoglycemia of 50s, during the ED course change in speech noticed, stroke code was called, NIHS 1 due to mild aphasia, CTH negative, CTA and CTP no perfusion abnormality. Pt had a head trauma during the game yesterday. He takes ASA at home. Pt also complains of RLE weakness which was due to R hip pain, and ROM full upon limiting the pain. On further evaluation he was able to ambulate without assist.      #Right lower extremity weakness associated with slowing of speech  to r/o stroke  h/o dm, hld  unlikely acute stroke as patient complains of chronic complaints   CTH negative, CTA and CTP no perfusion abnormality  neuro on board     Plan  - MRI brain w/o  - DAPT as ASA 81mg qd and Plavix 75mg qd  - Atorvastatin 80mg qd  - Follow up am LDL, A1c, TSH, toxicology screen      #COVID  Discharged 3 weeks ago from [facility] after being admitted for covid 19(not requiring mechanical ventilation)  ?persistent positive from last covid  NO sob now, saturating well on RA  CXR clear(pending read)    Plan  - Follow up inflamatory markers   - Follow up ID     #DM  - hypoglycemic to 50s at presentation as per notes  - fs  - Takes lantus 14U at bedtime, novolog 10U tid, ozempic once weekly, and metformin 1000mg BID  - Lantus 14U at bedtime and lispro ss    #Hypothyroid   - continue with levothyroxin 175mcg qd    DVT - Lovenox prophylactic  Diet - DASH diet  Activity - AAT  Pending - Follow up MR brain non con and neuro, am labs   67 M [facility] PMHx of DM, HLD, BPH presented as hypoglycemia of 50s, during the ED course change in speech noticed, stroke code was called, NIHS 1 due to mild aphasia, CTH negative, CTA and CTP no perfusion abnormality. Pt had a head trauma during the game yesterday. He takes ASA at home. Pt also complains of RLE weakness which was due to R hip pain, and ROM full upon limiting the pain. On further evaluation he was able to ambulate without assist.      #Right lower extremity weakness associated with slowing of speech  to r/o stroke  h/o dm, hld  unlikely acute stroke as patient complains of chronic complaints   CTH negative, CTA and CTP no perfusion abnormality  neuro on board     Plan  - MRI brain w/o  - DAPT as ASA 81mg qd and Plavix 75mg qd  - Atorvastatin 80mg qd  - Follow up am LDL, A1c, TSH, toxicology screen      #COVID  Discharged 3 weeks ago from [facility] after being admitted for covid 19(not requiring mechanical ventilation)  ?persistent positive from last covid  NO sob now, saturating well on RA  CXR clear(pending read)    Plan  - Follow up inflamatory markers   - Follow up ID     #DM  - hypoglycemic to 50s at presentation as per notes  - fs  - Takes lantus 14U at bedtime, novolog 10U tid, ozempic once weekly, and metformin 1000mg BID at home  - Lantus 14U at bedtime and lispro ss now    #Hypothyroid   - continue with levothyroxin 175mcg qd    DVT - Lovenox prophylactic  Diet - DASH diet  Activity - AAT  Pending - Follow up MR brain non con and neuro, am labs

## 2022-11-01 ENCOUNTER — TRANSCRIPTION ENCOUNTER (OUTPATIENT)
Age: 67
End: 2022-11-01

## 2022-11-01 VITALS
RESPIRATION RATE: 19 BRPM | OXYGEN SATURATION: 98 % | HEART RATE: 69 BPM | SYSTOLIC BLOOD PRESSURE: 135 MMHG | TEMPERATURE: 97 F | DIASTOLIC BLOOD PRESSURE: 63 MMHG

## 2022-11-01 LAB
GLUCOSE BLDC GLUCOMTR-MCNC: 127 MG/DL — HIGH (ref 70–99)
GLUCOSE BLDC GLUCOMTR-MCNC: 158 MG/DL — HIGH (ref 70–99)

## 2022-11-01 PROCEDURE — 99239 HOSP IP/OBS DSCHRG MGMT >30: CPT

## 2022-11-01 RX ADMIN — Medication 175 MICROGRAM(S): at 05:31

## 2022-11-01 RX ADMIN — PANTOPRAZOLE SODIUM 40 MILLIGRAM(S): 20 TABLET, DELAYED RELEASE ORAL at 06:23

## 2022-11-01 NOTE — PROGRESS NOTE ADULT - SUBJECTIVE AND OBJECTIVE BOX
INTERVAL HPI/OVERNIGHT EVENTS:    SUBJECTIVE: Patient seen and examined at bedside.     cc: RLE weakness  no cp, sob, abd pain, fever  no ha, dizziness, lightheadedness, syncope    OBJECTIVE:    VITAL SIGNS:  Vital Signs Last 24 Hrs  T(C): 36.1 (01 Nov 2022 09:13), Max: 37.6 (31 Oct 2022 15:26)  T(F): 96.9 (01 Nov 2022 09:13), Max: 99.6 (31 Oct 2022 15:26)  HR: 69 (01 Nov 2022 09:13) (69 - 101)  BP: 135/63 (01 Nov 2022 09:13) (135/63 - 147/75)  BP(mean): --  RR: 19 (01 Nov 2022 09:13) (18 - 19)  SpO2: 98% (01 Nov 2022 09:13) (97% - 100%)    Parameters below as of 01 Nov 2022 09:13  Patient On (Oxygen Delivery Method): room air          PHYSICAL EXAM:    General: NAD  HEENT: NC/AT; PERRL, clear conjunctiva  Neck: supple  Respiratory: CTA b/l  Cardiovascular: +S1/S2; RRR  Abdomen: soft, NT/ND; +BS x4  Extremities: WWP, 2+ peripheral pulses b/l; no LE edema  Skin: normal color and turgor; no rash  Neurological:    MEDICATIONS:  MEDICATIONS  (STANDING):  amitriptyline 50 milliGRAM(s) Oral at bedtime  aspirin  chewable 81 milliGRAM(s) Oral daily  atorvastatin 80 milliGRAM(s) Oral at bedtime  clopidogrel Tablet 75 milliGRAM(s) Oral daily  enoxaparin Injectable 40 milliGRAM(s) SubCutaneous every 24 hours  glucagon  Injectable 1 milliGRAM(s) IntraMuscular once  levothyroxine 175 MICROGram(s) Oral daily  pantoprazole    Tablet 40 milliGRAM(s) Oral before breakfast    MEDICATIONS  (PRN):      ALLERGIES:  Allergies    penicillin (Angioedema)    Intolerances        LABS:                        12.2   10.69 )-----------( 303      ( 31 Oct 2022 06:34 )             36.3     Hemoglobin: 12.2 g/dL (10-31 @ 06:34)  Hemoglobin: 12.1 g/dL (10-30 @ 19:19)    CBC Full  -  ( 31 Oct 2022 06:34 )  WBC Count : 10.69 K/uL  RBC Count : 4.16 M/uL  Hemoglobin : 12.2 g/dL  Hematocrit : 36.3 %  Platelet Count - Automated : 303 K/uL  Mean Cell Volume : 87.3 fL  Mean Cell Hemoglobin : 29.3 pg  Mean Cell Hemoglobin Concentration : 33.6 g/dL  Auto Neutrophil # : 6.58 K/uL  Auto Lymphocyte # : 3.05 K/uL  Auto Monocyte # : 0.83 K/uL  Auto Eosinophil # : 0.12 K/uL  Auto Basophil # : 0.05 K/uL  Auto Neutrophil % : 61.5 %  Auto Lymphocyte % : 28.5 %  Auto Monocyte % : 7.8 %  Auto Eosinophil % : 1.1 %  Auto Basophil % : 0.5 %    10-31    141  |  105  |  12  ----------------------------<  105<H>  4.2   |  26  |  0.9    Ca    9.1      31 Oct 2022 06:34  Mg     1.4     10-31    TPro  6.3  /  Alb  3.5  /  TBili  0.5  /  DBili  x   /  AST  15  /  ALT  13  /  AlkPhos  83  10-31    Creatinine Trend: 0.9<--, 1.0<--  LIVER FUNCTIONS - ( 31 Oct 2022 06:34 )  Alb: 3.5 g/dL / Pro: 6.3 g/dL / ALK PHOS: 83 U/L / ALT: 13 U/L / AST: 15 U/L / GGT: x           PT/INR - ( 30 Oct 2022 19:19 )   PT: 11.90 sec;   INR: 1.04 ratio         PTT - ( 30 Oct 2022 19:19 )  PTT:31.0 sec    hs Troponin:              CSF:                      EKG:   MICROBIOLOGY:    IMAGING:      Labs, imaging, EKG personally reviewed    RADIOLOGY & ADDITIONAL TESTS: Reviewed.

## 2022-11-01 NOTE — DISCHARGE NOTE PROVIDER - HOSPITAL COURSE
67 M [facility] PMHx of DM, HLD, BPH presented to the hospital with complaints of right lower leg weakness. 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 He had pain on the head but no other complaints at that time. No LOC, ENT bleed, dizziness.     Patient noticed that his right leg was weak yesterday when he went for shopping. When asked further he mentioned that right leg was shaky and gave up at one point so he had to hold to the window. Patient says this is the 6th time it happened in last two years. He also noted that his speech was slow/stuttering for the last 2 months. No shaking of arms, asymmetry of face, LOC, blurred vision.    Off note patient was discharged for [facility] 3 weeks ago after being admitted for COVID for 4 days not requiring mechanical ventilation.       In the ED  Presented as hypoglycemia of 50s, during the ED course change in speech noticed, stroke code was called, NIHS 1 due to mild aphasia.  Vitals stable, saturating well on RA  Labs show WBC 11.7, Hb 12  COVID positive  CTH negative, CTA and CTP shows no perfusion abnormalities    Patient was seen by neurology no TPA, planned for MRI tomorrow and admitted for suspected stroke and covid 19      Hospital Course:     Pt had stroke workup done and was negative including CTH, CTA and CTP with no perfusion abnormalities. Brain MRI was also done and did not show any significant abnormalities. Pt was cleared from neuro side for discharge. His leg pain improved and pt  was was able to ambulate with no difficulties. Pt had a head trauma from baseball recently.

## 2022-11-01 NOTE — DISCHARGE NOTE PROVIDER - PROVIDER TOKENS
FREE:[LAST:[[email or web address]],FIRST:[Casey],PHONE:[phone]],FAX:[phone]    -],ADDRESS:[[address] [place] [address]],FOLLOWUP:[1 week]]

## 2022-11-01 NOTE — DISCHARGE NOTE NURSING/CASE MANAGEMENT/SOCIAL WORK - PATIENT PORTAL LINK FT
You can access the FollowMyHealth Patient Portal offered by [facility] by registering at the following website: [email or web address]. By joining [email or web address]’s FollowMyHealth portal, you will also be able to view your health information using other applications (apps) compatible with our system.

## 2022-11-01 NOTE — DISCHARGE NOTE PROVIDER - CARE PROVIDER_API CALL
Casey rose  [address] [place] [address]  Phone: [phone]  Fax: (   )    -  Follow Up Time: 1 week

## 2022-11-01 NOTE — PROGRESS NOTE ADULT - TIME BILLING
67M PMHx DM2, HLD, hypothyroidism, BPH here with RLE weakness, head trauma.    #RLE weakness, dysarthria  weakness chronic for past year, LE gives out resulting in 6 falls over past year  +trauma to head by baseball  cth neg  mri neg  hold sulfonylurea on d/c  appreciate neuro, outpt f/u  stable for d/c, needs outpt pmd, neuro f/u one week  #DM2  resume home regimen; hold sulfonylurea  #HLD  asa, plavix  lipitor 80  #Hypothyroidism  synthroid 175  #BPH  outpt f/u  #DVT ppx  lovenox

## 2022-11-01 NOTE — DISCHARGE NOTE PROVIDER - NSDCCPCAREPLAN_GEN_ALL_CORE_FT
PRINCIPAL DISCHARGE DIAGNOSIS  Diagnosis: Leg weakness  Assessment and Plan of Treatment: Your leg weakness was probably chronic with no acute pathology in brain imaging. Please continue to take extra cautioun in physical activity.

## 2022-11-03 LAB — DRUG SCREEN, SERUM: SIGNIFICANT CHANGE UP

## 2022-11-10 NOTE — ASU PATIENT PROFILE, ADULT - PRO MENTAL HEALTH SX RECENT
Int right lower leg swelling post fall a month ago    Patient was placed in face mask during first look triage.  Patient was wearing a face mask throughout encounter.  I wore personal protective equipment throughout the encounter.  Hand hygiene was performed before and after patient encounter.      none

## 2023-09-22 ENCOUNTER — APPOINTMENT (OUTPATIENT)
Dept: PODIATRY | Facility: CLINIC | Age: 68
End: 2023-09-22
Payer: MEDICARE

## 2023-09-22 ENCOUNTER — OUTPATIENT (OUTPATIENT)
Dept: OUTPATIENT SERVICES | Facility: HOSPITAL | Age: 68
LOS: 1 days | End: 2023-09-22
Payer: MEDICARE

## 2023-09-22 DIAGNOSIS — B35.1 TINEA UNGUIUM: ICD-10-CM

## 2023-09-22 DIAGNOSIS — Z98.890 OTHER SPECIFIED POSTPROCEDURAL STATES: Chronic | ICD-10-CM

## 2023-09-22 DIAGNOSIS — Z00.00 ENCOUNTER FOR GENERAL ADULT MEDICAL EXAMINATION WITHOUT ABNORMAL FINDINGS: ICD-10-CM

## 2023-09-22 DIAGNOSIS — E11.42 TYPE 2 DIABETES MELLITUS WITH DIABETIC POLYNEUROPATHY: ICD-10-CM

## 2023-09-22 LAB
ESTIMATED AVERAGE GLUCOSE: 180 MG/DL
HBA1C MFR BLD HPLC: 7.9 %

## 2023-09-22 PROCEDURE — 83036 HEMOGLOBIN GLYCOSYLATED A1C: CPT

## 2023-09-22 PROCEDURE — 88312 SPECIAL STAINS GROUP 1: CPT

## 2023-09-22 PROCEDURE — 80076 HEPATIC FUNCTION PANEL: CPT

## 2023-09-22 PROCEDURE — 36415 COLL VENOUS BLD VENIPUNCTURE: CPT

## 2023-09-22 PROCEDURE — 99204 OFFICE O/P NEW MOD 45 MIN: CPT | Mod: 25

## 2023-09-22 PROCEDURE — 11755 BIOPSY NAIL UNIT: CPT | Mod: T5

## 2023-09-22 PROCEDURE — 88311 DECALCIFY TISSUE: CPT

## 2023-09-22 PROCEDURE — 11755 BIOPSY NAIL UNIT: CPT | Mod: RT

## 2023-09-22 PROCEDURE — 88304 TISSUE EXAM BY PATHOLOGIST: CPT
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
## 2023-09-27 DIAGNOSIS — E11.42 TYPE 2 DIABETES MELLITUS WITH DIABETIC POLYNEUROPATHY: ICD-10-CM

## 2023-09-27 DIAGNOSIS — B35.1 TINEA UNGUIUM: ICD-10-CM

## 2023-09-29 LAB
ALBUMIN SERPL ELPH-MCNC: 4.1 G/DL
ALP BLD-CCNC: 123 U/L
ALT SERPL-CCNC: 14 U/L
AST SERPL-CCNC: 17 U/L
BILIRUB DIRECT SERPL-MCNC: <0.2 MG/DL
BILIRUB INDIRECT SERPL-MCNC: >0.1 MG/DL
BILIRUB SERPL-MCNC: 0.3 MG/DL
CORE LAB BIOPSY: NORMAL
PROT SERPL-MCNC: 7 G/DL

## 2023-10-17 ENCOUNTER — APPOINTMENT (OUTPATIENT)
Dept: PAIN MANAGEMENT | Facility: CLINIC | Age: 68
End: 2023-10-17